# Patient Record
Sex: FEMALE | Race: WHITE | NOT HISPANIC OR LATINO | Employment: FULL TIME | URBAN - METROPOLITAN AREA
[De-identification: names, ages, dates, MRNs, and addresses within clinical notes are randomized per-mention and may not be internally consistent; named-entity substitution may affect disease eponyms.]

---

## 2017-01-24 ENCOUNTER — COMMUNICATION - HEALTHEAST (OUTPATIENT)
Dept: FAMILY MEDICINE | Facility: CLINIC | Age: 52
End: 2017-01-24

## 2017-01-24 DIAGNOSIS — K21.9 GERD (GASTROESOPHAGEAL REFLUX DISEASE): ICD-10-CM

## 2017-01-24 DIAGNOSIS — G47.00 INSOMNIA: ICD-10-CM

## 2017-01-27 ENCOUNTER — RECORDS - HEALTHEAST (OUTPATIENT)
Dept: ADMINISTRATIVE | Facility: OTHER | Age: 52
End: 2017-01-27

## 2017-08-15 ENCOUNTER — RECORDS - HEALTHEAST (OUTPATIENT)
Dept: MAMMOGRAPHY | Facility: CLINIC | Age: 52
End: 2017-08-15

## 2017-08-15 ENCOUNTER — RECORDS - HEALTHEAST (OUTPATIENT)
Dept: GENERAL RADIOLOGY | Facility: CLINIC | Age: 52
End: 2017-08-15

## 2017-08-15 ENCOUNTER — OFFICE VISIT - HEALTHEAST (OUTPATIENT)
Dept: FAMILY MEDICINE | Facility: CLINIC | Age: 52
End: 2017-08-15

## 2017-08-15 DIAGNOSIS — M25.572 ANKLE PAIN, LEFT: ICD-10-CM

## 2017-08-15 DIAGNOSIS — Z12.31 VISIT FOR SCREENING MAMMOGRAM: ICD-10-CM

## 2017-08-15 DIAGNOSIS — M79.645 THUMB PAIN, LEFT: ICD-10-CM

## 2017-08-15 DIAGNOSIS — Z12.31 ENCOUNTER FOR SCREENING MAMMOGRAM FOR MALIGNANT NEOPLASM OF BREAST: ICD-10-CM

## 2017-08-15 DIAGNOSIS — M25.572 PAIN IN LEFT ANKLE AND JOINTS OF LEFT FOOT: ICD-10-CM

## 2017-08-15 DIAGNOSIS — G47.30 SLEEP APNEA: ICD-10-CM

## 2017-08-15 DIAGNOSIS — M79.645 PAIN IN LEFT FINGER(S): ICD-10-CM

## 2017-08-22 ENCOUNTER — OFFICE VISIT - HEALTHEAST (OUTPATIENT)
Dept: SLEEP MEDICINE | Facility: CLINIC | Age: 52
End: 2017-08-22

## 2017-08-22 DIAGNOSIS — G47.10 HYPERSOMNIA, UNSPECIFIED: ICD-10-CM

## 2017-08-22 DIAGNOSIS — G47.8 SLEEP DYSFUNCTION WITH SLEEP STAGE DISTURBANCE: ICD-10-CM

## 2017-08-22 DIAGNOSIS — R06.83 SNORING: ICD-10-CM

## 2017-08-22 ASSESSMENT — MIFFLIN-ST. JEOR: SCORE: 1454.53

## 2017-08-24 ENCOUNTER — RECORDS - HEALTHEAST (OUTPATIENT)
Dept: SLEEP MEDICINE | Facility: CLINIC | Age: 52
End: 2017-08-24

## 2017-08-24 ENCOUNTER — RECORDS - HEALTHEAST (OUTPATIENT)
Dept: ADMINISTRATIVE | Facility: OTHER | Age: 52
End: 2017-08-24

## 2017-08-24 DIAGNOSIS — R06.83 SNORING: ICD-10-CM

## 2017-08-24 DIAGNOSIS — G47.10 HYPERSOMNIA, UNSPECIFIED: ICD-10-CM

## 2017-08-24 DIAGNOSIS — G47.8 OTHER SLEEP DISORDERS: ICD-10-CM

## 2017-08-28 ENCOUNTER — COMMUNICATION - HEALTHEAST (OUTPATIENT)
Dept: SLEEP MEDICINE | Facility: CLINIC | Age: 52
End: 2017-08-28

## 2017-09-05 ENCOUNTER — COMMUNICATION - HEALTHEAST (OUTPATIENT)
Dept: FAMILY MEDICINE | Facility: CLINIC | Age: 52
End: 2017-09-05

## 2017-09-05 DIAGNOSIS — K21.9 GERD (GASTROESOPHAGEAL REFLUX DISEASE): ICD-10-CM

## 2017-09-05 DIAGNOSIS — G47.00 INSOMNIA: ICD-10-CM

## 2017-09-13 ENCOUNTER — COMMUNICATION - HEALTHEAST (OUTPATIENT)
Dept: SCHEDULING | Facility: CLINIC | Age: 52
End: 2017-09-13

## 2017-09-13 DIAGNOSIS — G47.00 INSOMNIA: ICD-10-CM

## 2017-12-18 ENCOUNTER — OFFICE VISIT - HEALTHEAST (OUTPATIENT)
Dept: FAMILY MEDICINE | Facility: CLINIC | Age: 52
End: 2017-12-18

## 2017-12-18 DIAGNOSIS — M79.662 PAIN IN BOTH LOWER LEGS: ICD-10-CM

## 2017-12-18 DIAGNOSIS — M79.661 PAIN IN BOTH LOWER LEGS: ICD-10-CM

## 2018-03-06 ENCOUNTER — COMMUNICATION - HEALTHEAST (OUTPATIENT)
Dept: FAMILY MEDICINE | Facility: CLINIC | Age: 53
End: 2018-03-06

## 2018-03-06 DIAGNOSIS — G47.00 INSOMNIA: ICD-10-CM

## 2018-03-16 ENCOUNTER — COMMUNICATION - HEALTHEAST (OUTPATIENT)
Dept: FAMILY MEDICINE | Facility: CLINIC | Age: 53
End: 2018-03-16

## 2018-03-16 DIAGNOSIS — G47.00 INSOMNIA: ICD-10-CM

## 2018-03-24 ENCOUNTER — COMMUNICATION - HEALTHEAST (OUTPATIENT)
Dept: FAMILY MEDICINE | Facility: CLINIC | Age: 53
End: 2018-03-24

## 2018-03-24 DIAGNOSIS — G47.00 INSOMNIA: ICD-10-CM

## 2018-06-05 ENCOUNTER — COMMUNICATION - HEALTHEAST (OUTPATIENT)
Dept: FAMILY MEDICINE | Facility: CLINIC | Age: 53
End: 2018-06-05

## 2018-06-05 DIAGNOSIS — G47.00 INSOMNIA: ICD-10-CM

## 2018-06-11 ENCOUNTER — COMMUNICATION - HEALTHEAST (OUTPATIENT)
Dept: FAMILY MEDICINE | Facility: CLINIC | Age: 53
End: 2018-06-11

## 2018-06-11 DIAGNOSIS — G47.00 INSOMNIA: ICD-10-CM

## 2018-06-26 ENCOUNTER — COMMUNICATION - HEALTHEAST (OUTPATIENT)
Dept: FAMILY MEDICINE | Facility: CLINIC | Age: 53
End: 2018-06-26

## 2018-06-26 DIAGNOSIS — K21.9 GERD (GASTROESOPHAGEAL REFLUX DISEASE): ICD-10-CM

## 2018-09-06 ENCOUNTER — OFFICE VISIT - HEALTHEAST (OUTPATIENT)
Dept: FAMILY MEDICINE | Facility: CLINIC | Age: 53
End: 2018-09-06

## 2018-09-06 DIAGNOSIS — Z12.31 VISIT FOR SCREENING MAMMOGRAM: ICD-10-CM

## 2018-09-06 DIAGNOSIS — J39.2 THROAT IRRITATION: ICD-10-CM

## 2018-09-06 DIAGNOSIS — Z00.00 ROUTINE GENERAL MEDICAL EXAMINATION AT A HEALTH CARE FACILITY: ICD-10-CM

## 2018-09-06 DIAGNOSIS — R05.9 COUGH: ICD-10-CM

## 2018-09-06 DIAGNOSIS — Z98.84 BARIATRIC SURGERY STATUS: ICD-10-CM

## 2018-09-06 DIAGNOSIS — M79.645 THUMB PAIN, LEFT: ICD-10-CM

## 2018-09-06 DIAGNOSIS — R42 VERTIGO: ICD-10-CM

## 2018-09-06 DIAGNOSIS — K76.0 FATTY LIVER: ICD-10-CM

## 2018-09-06 DIAGNOSIS — R42 DIZZINESS: ICD-10-CM

## 2018-09-06 DIAGNOSIS — R00.0 TACHYCARDIA: ICD-10-CM

## 2018-09-06 DIAGNOSIS — D35.1 BENIGN NEOPLASM OF PARATHYROID GLAND: ICD-10-CM

## 2018-09-06 DIAGNOSIS — Z12.11 SCREEN FOR COLON CANCER: ICD-10-CM

## 2018-09-06 LAB
ALBUMIN SERPL-MCNC: 3.8 G/DL (ref 3.5–5)
ALP SERPL-CCNC: 72 U/L (ref 45–120)
ALT SERPL W P-5'-P-CCNC: 67 U/L (ref 0–45)
ANION GAP SERPL CALCULATED.3IONS-SCNC: 8 MMOL/L (ref 5–18)
AST SERPL W P-5'-P-CCNC: 48 U/L (ref 0–40)
BILIRUB SERPL-MCNC: 0.2 MG/DL (ref 0–1)
BUN SERPL-MCNC: 16 MG/DL (ref 8–22)
CALCIUM SERPL-MCNC: 9.1 MG/DL (ref 8.5–10.5)
CHLORIDE BLD-SCNC: 106 MMOL/L (ref 98–107)
CHOLEST SERPL-MCNC: 203 MG/DL
CO2 SERPL-SCNC: 29 MMOL/L (ref 22–31)
CREAT SERPL-MCNC: 0.81 MG/DL (ref 0.6–1.1)
ERYTHROCYTE [DISTWIDTH] IN BLOOD BY AUTOMATED COUNT: 12.6 % (ref 11–14.5)
FASTING STATUS PATIENT QL REPORTED: NO
GFR SERPL CREATININE-BSD FRML MDRD: >60 ML/MIN/1.73M2
GLUCOSE BLD-MCNC: 88 MG/DL (ref 70–125)
HCT VFR BLD AUTO: 39.9 % (ref 35–47)
HDLC SERPL-MCNC: 75 MG/DL
HGB BLD-MCNC: 13 G/DL (ref 12–16)
LDLC SERPL CALC-MCNC: 109 MG/DL
MCH RBC QN AUTO: 29.5 PG (ref 27–34)
MCHC RBC AUTO-ENTMCNC: 32.6 G/DL (ref 32–36)
MCV RBC AUTO: 90 FL (ref 80–100)
PLATELET # BLD AUTO: 186 THOU/UL (ref 140–440)
PMV BLD AUTO: 8.2 FL (ref 7–10)
POTASSIUM BLD-SCNC: 4.2 MMOL/L (ref 3.5–5)
PROT SERPL-MCNC: 6.8 G/DL (ref 6–8)
PTH-INTACT SERPL-MCNC: 60 PG/ML (ref 10–86)
RBC # BLD AUTO: 4.41 MILL/UL (ref 3.8–5.4)
SODIUM SERPL-SCNC: 143 MMOL/L (ref 136–145)
TRIGL SERPL-MCNC: 96 MG/DL
TSH SERPL DL<=0.005 MIU/L-ACNC: 0.76 UIU/ML (ref 0.3–5)
VIT B12 SERPL-MCNC: >2000 PG/ML (ref 213–816)
WBC: 6.7 THOU/UL (ref 4–11)

## 2018-09-06 ASSESSMENT — MIFFLIN-ST. JEOR: SCORE: 1432.76

## 2018-09-07 ENCOUNTER — OFFICE VISIT - HEALTHEAST (OUTPATIENT)
Dept: CARDIOLOGY | Facility: CLINIC | Age: 53
End: 2018-09-07

## 2018-09-07 DIAGNOSIS — R00.0 TACHYCARDIA: ICD-10-CM

## 2018-09-07 DIAGNOSIS — G47.33 OSA (OBSTRUCTIVE SLEEP APNEA): ICD-10-CM

## 2018-09-07 LAB
25(OH)D3 SERPL-MCNC: 47.4 NG/ML (ref 30–80)
ATRIAL RATE - MUSE: 75 BPM
DIASTOLIC BLOOD PRESSURE - MUSE: NORMAL MMHG
INTERPRETATION ECG - MUSE: NORMAL
P AXIS - MUSE: 34 DEGREES
PR INTERVAL - MUSE: 172 MS
QRS DURATION - MUSE: 104 MS
QT - MUSE: 394 MS
QTC - MUSE: 439 MS
R AXIS - MUSE: 42 DEGREES
SYSTOLIC BLOOD PRESSURE - MUSE: NORMAL MMHG
T AXIS - MUSE: 36 DEGREES
VENTRICULAR RATE- MUSE: 75 BPM

## 2018-09-07 ASSESSMENT — MIFFLIN-ST. JEOR: SCORE: 1437.3

## 2018-09-25 ENCOUNTER — OFFICE VISIT - HEALTHEAST (OUTPATIENT)
Dept: OTOLARYNGOLOGY | Facility: CLINIC | Age: 53
End: 2018-09-25

## 2018-09-25 ENCOUNTER — OFFICE VISIT - HEALTHEAST (OUTPATIENT)
Dept: AUDIOLOGY | Facility: CLINIC | Age: 53
End: 2018-09-25

## 2018-09-25 DIAGNOSIS — R42 DIZZINESS AND GIDDINESS: ICD-10-CM

## 2018-09-25 DIAGNOSIS — H90.3 SENSORINEURAL HEARING LOSS, BILATERAL: ICD-10-CM

## 2018-09-25 DIAGNOSIS — R42 DIZZINESS: ICD-10-CM

## 2018-10-04 ENCOUNTER — HOSPITAL ENCOUNTER (OUTPATIENT)
Dept: MAMMOGRAPHY | Facility: CLINIC | Age: 53
Discharge: HOME | End: 2018-10-04
Attending: FAMILY MEDICINE

## 2018-10-04 ENCOUNTER — OFFICE VISIT - HEALTHEAST (OUTPATIENT)
Dept: OTOLARYNGOLOGY | Facility: CLINIC | Age: 53
End: 2018-10-04

## 2018-10-04 DIAGNOSIS — R07.0 THROAT PAIN: ICD-10-CM

## 2018-10-04 DIAGNOSIS — K21.9 GERD (GASTROESOPHAGEAL REFLUX DISEASE): ICD-10-CM

## 2018-10-04 DIAGNOSIS — Z12.31 VISIT FOR SCREENING MAMMOGRAM: ICD-10-CM

## 2018-10-12 ENCOUNTER — RECORDS - HEALTHEAST (OUTPATIENT)
Dept: ADMINISTRATIVE | Facility: OTHER | Age: 53
End: 2018-10-12

## 2018-11-15 ENCOUNTER — COMMUNICATION - HEALTHEAST (OUTPATIENT)
Dept: FAMILY MEDICINE | Facility: CLINIC | Age: 53
End: 2018-11-15

## 2018-11-15 DIAGNOSIS — G47.00 INSOMNIA: ICD-10-CM

## 2018-11-29 ENCOUNTER — COMMUNICATION - HEALTHEAST (OUTPATIENT)
Dept: FAMILY MEDICINE | Facility: CLINIC | Age: 53
End: 2018-11-29

## 2018-11-29 DIAGNOSIS — K21.9 GERD (GASTROESOPHAGEAL REFLUX DISEASE): ICD-10-CM

## 2019-01-09 ENCOUNTER — RECORDS - HEALTHEAST (OUTPATIENT)
Dept: ADMINISTRATIVE | Facility: OTHER | Age: 54
End: 2019-01-09

## 2019-01-24 ENCOUNTER — OFFICE VISIT - HEALTHEAST (OUTPATIENT)
Dept: FAMILY MEDICINE | Facility: CLINIC | Age: 54
End: 2019-01-24

## 2019-01-24 DIAGNOSIS — K76.0 FATTY LIVER: ICD-10-CM

## 2019-01-24 DIAGNOSIS — Z98.84 BARIATRIC SURGERY STATUS: ICD-10-CM

## 2019-01-24 DIAGNOSIS — M62.838 MUSCLE SPASM: ICD-10-CM

## 2019-01-24 DIAGNOSIS — D12.6 ADENOMATOUS POLYP OF COLON, UNSPECIFIED PART OF COLON: ICD-10-CM

## 2019-01-24 DIAGNOSIS — R79.89 ELEVATED LFTS: ICD-10-CM

## 2019-01-24 DIAGNOSIS — K52.9 ENTERITIS: ICD-10-CM

## 2019-02-28 ENCOUNTER — RECORDS - HEALTHEAST (OUTPATIENT)
Dept: ADMINISTRATIVE | Facility: OTHER | Age: 54
End: 2019-02-28

## 2019-03-01 ENCOUNTER — HOSPITAL ENCOUNTER (OUTPATIENT)
Dept: RADIOLOGY | Facility: CLINIC | Age: 54
Discharge: HOME | End: 2019-03-01
Attending: INTERNAL MEDICINE

## 2019-03-01 DIAGNOSIS — K52.9 ENTERITIS: ICD-10-CM

## 2019-03-01 DIAGNOSIS — R94.5 ABNORMAL RESULTS OF LIVER FUNCTION STUDIES: ICD-10-CM

## 2019-03-01 DIAGNOSIS — R79.89 ELEVATED LFTS: ICD-10-CM

## 2019-03-07 ENCOUNTER — RECORDS - HEALTHEAST (OUTPATIENT)
Dept: ADMINISTRATIVE | Facility: OTHER | Age: 54
End: 2019-03-07

## 2019-03-21 ENCOUNTER — COMMUNICATION - HEALTHEAST (OUTPATIENT)
Dept: FAMILY MEDICINE | Facility: CLINIC | Age: 54
End: 2019-03-21

## 2019-07-22 ENCOUNTER — RECORDS - HEALTHEAST (OUTPATIENT)
Dept: ADMINISTRATIVE | Facility: OTHER | Age: 54
End: 2019-07-22

## 2019-07-23 ENCOUNTER — RECORDS - HEALTHEAST (OUTPATIENT)
Dept: LAB | Facility: CLINIC | Age: 54
End: 2019-07-23

## 2019-07-23 ENCOUNTER — COMMUNICATION - HEALTHEAST (OUTPATIENT)
Dept: FAMILY MEDICINE | Facility: CLINIC | Age: 54
End: 2019-07-23

## 2019-07-23 DIAGNOSIS — K21.9 GERD (GASTROESOPHAGEAL REFLUX DISEASE): ICD-10-CM

## 2019-07-23 LAB
CALCIUM SERPL-MCNC: 9.7 MG/DL (ref 8.5–10.5)
TSH SERPL DL<=0.005 MIU/L-ACNC: 0.78 UIU/ML (ref 0.3–5)

## 2019-08-01 ENCOUNTER — COMMUNICATION - HEALTHEAST (OUTPATIENT)
Dept: FAMILY MEDICINE | Facility: CLINIC | Age: 54
End: 2019-08-01

## 2019-08-01 DIAGNOSIS — K21.9 GERD (GASTROESOPHAGEAL REFLUX DISEASE): ICD-10-CM

## 2019-08-15 ENCOUNTER — HOSPITAL ENCOUNTER (OUTPATIENT)
Dept: RADIOLOGY | Facility: CLINIC | Age: 54
Discharge: HOME | End: 2019-08-15
Attending: OTOLARYNGOLOGY

## 2019-08-15 DIAGNOSIS — R09.A2 FEELING OF FOREIGN BODY IN THROAT: ICD-10-CM

## 2019-08-15 DIAGNOSIS — R13.10 DYSPHAGIA, UNSPECIFIED TYPE: ICD-10-CM

## 2019-08-15 DIAGNOSIS — R49.0 HOARSENESS: ICD-10-CM

## 2019-09-28 ENCOUNTER — COMMUNICATION - HEALTHEAST (OUTPATIENT)
Dept: FAMILY MEDICINE | Facility: CLINIC | Age: 54
End: 2019-09-28

## 2019-09-28 DIAGNOSIS — G47.00 INSOMNIA: ICD-10-CM

## 2019-12-16 ENCOUNTER — COMMUNICATION - HEALTHEAST (OUTPATIENT)
Dept: FAMILY MEDICINE | Facility: CLINIC | Age: 54
End: 2019-12-16

## 2019-12-16 DIAGNOSIS — G47.00 INSOMNIA: ICD-10-CM

## 2020-01-03 ENCOUNTER — COMMUNICATION - HEALTHEAST (OUTPATIENT)
Dept: FAMILY MEDICINE | Facility: CLINIC | Age: 55
End: 2020-01-03

## 2020-01-03 DIAGNOSIS — K21.9 GERD (GASTROESOPHAGEAL REFLUX DISEASE): ICD-10-CM

## 2020-03-05 ENCOUNTER — COMMUNICATION - HEALTHEAST (OUTPATIENT)
Dept: FAMILY MEDICINE | Facility: CLINIC | Age: 55
End: 2020-03-05

## 2020-03-05 DIAGNOSIS — G47.00 INSOMNIA: ICD-10-CM

## 2020-03-17 ENCOUNTER — HOSPITAL ENCOUNTER (OUTPATIENT)
Dept: MAMMOGRAPHY | Facility: CLINIC | Age: 55
Discharge: HOME | End: 2020-03-17

## 2020-03-17 DIAGNOSIS — Z12.31 VISIT FOR SCREENING MAMMOGRAM: ICD-10-CM

## 2020-05-21 ENCOUNTER — RECORDS - HEALTHEAST (OUTPATIENT)
Dept: LAB | Facility: CLINIC | Age: 55
End: 2020-05-21

## 2020-05-21 LAB
ALBUMIN SERPL-MCNC: 3.8 G/DL (ref 3.5–5)
ALP SERPL-CCNC: 74 U/L (ref 45–120)
ALT SERPL W P-5'-P-CCNC: 59 U/L (ref 0–45)
ANION GAP SERPL CALCULATED.3IONS-SCNC: 9 MMOL/L (ref 5–18)
AST SERPL W P-5'-P-CCNC: 35 U/L (ref 0–40)
BILIRUB SERPL-MCNC: 0.3 MG/DL (ref 0–1)
BUN SERPL-MCNC: 19 MG/DL (ref 8–22)
C REACTIVE PROTEIN LHE: 0.2 MG/DL (ref 0–0.8)
CALCIUM SERPL-MCNC: 9.2 MG/DL (ref 8.5–10.5)
CHLORIDE BLD-SCNC: 105 MMOL/L (ref 98–107)
CK SERPL-CCNC: 115 U/L (ref 30–190)
CO2 SERPL-SCNC: 27 MMOL/L (ref 22–31)
CREAT SERPL-MCNC: 0.74 MG/DL (ref 0.6–1.1)
ERYTHROCYTE [SEDIMENTATION RATE] IN BLOOD BY WESTERGREN METHOD: 21 MM/HR (ref 0–20)
GFR SERPL CREATININE-BSD FRML MDRD: >60 ML/MIN/1.73M2
GLUCOSE BLD-MCNC: 94 MG/DL (ref 70–125)
POTASSIUM BLD-SCNC: 4.3 MMOL/L (ref 3.5–5)
PROT SERPL-MCNC: 7 G/DL (ref 6–8)
SODIUM SERPL-SCNC: 141 MMOL/L (ref 136–145)

## 2020-05-26 ENCOUNTER — RECORDS - HEALTHEAST (OUTPATIENT)
Dept: LAB | Facility: CLINIC | Age: 55
End: 2020-05-26

## 2020-05-26 LAB — TSH SERPL DL<=0.005 MIU/L-ACNC: 0.8 UIU/ML (ref 0.3–5)

## 2020-06-16 ENCOUNTER — COMMUNICATION - HEALTHEAST (OUTPATIENT)
Dept: FAMILY MEDICINE | Facility: CLINIC | Age: 55
End: 2020-06-16

## 2020-06-16 DIAGNOSIS — K21.9 GERD (GASTROESOPHAGEAL REFLUX DISEASE): ICD-10-CM

## 2020-06-24 ENCOUNTER — RECORDS - HEALTHEAST (OUTPATIENT)
Dept: LAB | Facility: CLINIC | Age: 55
End: 2020-06-24

## 2020-06-24 LAB
ALBUMIN SERPL-MCNC: 3.6 G/DL (ref 3.5–5)
ALP SERPL-CCNC: 78 U/L (ref 45–120)
ALT SERPL W P-5'-P-CCNC: 55 U/L (ref 0–45)
AST SERPL W P-5'-P-CCNC: 40 U/L (ref 0–40)
BILIRUB DIRECT SERPL-MCNC: 0.1 MG/DL
BILIRUB SERPL-MCNC: 0.4 MG/DL (ref 0–1)
ERYTHROCYTE [SEDIMENTATION RATE] IN BLOOD BY WESTERGREN METHOD: 24 MM/HR (ref 0–20)
PROT SERPL-MCNC: 6.6 G/DL (ref 6–8)

## 2020-07-20 ENCOUNTER — COMMUNICATION - HEALTHEAST (OUTPATIENT)
Dept: FAMILY MEDICINE | Facility: CLINIC | Age: 55
End: 2020-07-20

## 2020-08-03 ENCOUNTER — COMMUNICATION - HEALTHEAST (OUTPATIENT)
Dept: FAMILY MEDICINE | Facility: CLINIC | Age: 55
End: 2020-08-03

## 2020-10-14 DIAGNOSIS — Z11.59 ENCOUNTER FOR SCREENING FOR OTHER VIRAL DISEASES: Primary | ICD-10-CM

## 2020-11-02 ENCOUNTER — RECORDS - HEALTHEAST (OUTPATIENT)
Dept: LAB | Facility: CLINIC | Age: 55
End: 2020-11-02

## 2020-11-02 ENCOUNTER — TRANSFERRED RECORDS (OUTPATIENT)
Dept: HEALTH INFORMATION MANAGEMENT | Facility: CLINIC | Age: 55
End: 2020-11-02

## 2020-11-02 LAB
C REACTIVE PROTEIN LHE: 0.2 MG/DL (ref 0–0.8)
CK SERPL-CCNC: 109 U/L (ref 30–190)

## 2020-11-03 LAB — B BURGDOR IGG+IGM SER QL: 0.08 INDEX VALUE

## 2020-11-05 LAB — ANA SER QL: 1.5 U

## 2020-11-14 DIAGNOSIS — Z11.59 ENCOUNTER FOR SCREENING FOR OTHER VIRAL DISEASES: ICD-10-CM

## 2020-11-14 PROCEDURE — 99000 SPECIMEN HANDLING OFFICE-LAB: CPT | Performed by: PATHOLOGY

## 2020-11-14 PROCEDURE — U0003 INFECTIOUS AGENT DETECTION BY NUCLEIC ACID (DNA OR RNA); SEVERE ACUTE RESPIRATORY SYNDROME CORONAVIRUS 2 (SARS-COV-2) (CORONAVIRUS DISEASE [COVID-19]), AMPLIFIED PROBE TECHNIQUE, MAKING USE OF HIGH THROUGHPUT TECHNOLOGIES AS DESCRIBED BY CMS-2020-01-R: HCPCS | Mod: 90 | Performed by: PATHOLOGY

## 2020-11-15 LAB
SARS-COV-2 RNA SPEC QL NAA+PROBE: NOT DETECTED
SPECIMEN SOURCE: NORMAL

## 2020-11-17 RX ORDER — ONDANSETRON 4 MG/1
4 TABLET, ORALLY DISINTEGRATING ORAL EVERY 8 HOURS PRN
COMMUNITY

## 2020-11-17 RX ORDER — NICOTINE POLACRILEX 4 MG/1
20 GUM, CHEWING ORAL EVERY MORNING
COMMUNITY

## 2020-11-17 RX ORDER — METRONIDAZOLE 500 MG/1
500 TABLET ORAL 3 TIMES DAILY
Status: ON HOLD | COMMUNITY
Start: 2020-11-17 | End: 2020-11-19

## 2020-11-17 RX ORDER — MULTIVITAMIN,THERAPEUTIC
1 TABLET ORAL DAILY
COMMUNITY

## 2020-11-17 RX ORDER — NEOMYCIN SULFATE 500 MG/1
1000 TABLET ORAL 3 TIMES DAILY
Status: ON HOLD | COMMUNITY
Start: 2020-11-17 | End: 2020-11-19

## 2020-11-17 RX ORDER — AMITRIPTYLINE HYDROCHLORIDE 50 MG/1
50 TABLET ORAL AT BEDTIME
COMMUNITY

## 2020-11-17 SDOH — HEALTH STABILITY: MENTAL HEALTH: HOW OFTEN DO YOU HAVE A DRINK CONTAINING ALCOHOL?: NEVER

## 2020-11-17 NOTE — PROGRESS NOTES
PTA medications updated by Medication Scribe prior to surgery via phone call with patient      -LAST DOSES ENTERED BY NURSE-    Comments:    Medication history sources: Patient, Care Everywhere and (Chart Review)  Medication history source reliability: Good  Adherence assessment: N/A Not Observed    Significant changes made to the medication list:  None      Additional medication history information:   None        Prior to Admission medications    Medication Sig Last Dose Taking? Auth Provider   amitriptyline (ELAVIL) 50 MG tablet Take 50 mg by mouth At Bedtime  at HS Yes Reported, Patient   metroNIDAZOLE (FLAGYL) 500 MG tablet Take 500 mg by mouth 3 times daily (For 1 day) at 13:00, 14:00 and 23:00  at 2300 Yes Reported, Patient   neomycin (MYCIFRADIN) 500 MG tablet Take 1,000 mg by mouth 3 times daily (for 1 day)(2 X 500 mg) at 13:00, 14:00 and 23:00  at 2300 Yes Reported, Patient   omeprazole 20 MG tablet Take 20 mg by mouth every morning  at AM Yes Reported, Patient   ondansetron (ZOFRAN-ODT) 4 MG ODT tab Take 4 mg by mouth every 8 hours as needed for nausea  at PRN Yes Reported, Patient

## 2020-11-18 ENCOUNTER — ANESTHESIA (OUTPATIENT)
Dept: SURGERY | Facility: CLINIC | Age: 55
End: 2020-11-18
Payer: COMMERCIAL

## 2020-11-18 ENCOUNTER — HOSPITAL ENCOUNTER (INPATIENT)
Facility: CLINIC | Age: 55
LOS: 2 days | Discharge: HOME | End: 2020-11-20
Attending: COLON & RECTAL SURGERY | Admitting: COLON & RECTAL SURGERY
Payer: COMMERCIAL

## 2020-11-18 ENCOUNTER — ANESTHESIA EVENT (OUTPATIENT)
Dept: SURGERY | Facility: CLINIC | Age: 55
End: 2020-11-18
Payer: COMMERCIAL

## 2020-11-18 DIAGNOSIS — K62.3 RECTAL PROLAPSE: Primary | ICD-10-CM

## 2020-11-18 LAB — HGB BLD-MCNC: 13.5 G/DL (ref 11.7–15.7)

## 2020-11-18 PROCEDURE — 250N000011 HC RX IP 250 OP 636: Performed by: NURSE ANESTHETIST, CERTIFIED REGISTERED

## 2020-11-18 PROCEDURE — 258N000003 HC RX IP 258 OP 636: Performed by: COLON & RECTAL SURGERY

## 2020-11-18 PROCEDURE — 360N000069 HC SURGERY LEVEL 8 EA 15 ADDTL MIN: Performed by: COLON & RECTAL SURGERY

## 2020-11-18 PROCEDURE — 0DSP4ZZ REPOSITION RECTUM, PERCUTANEOUS ENDOSCOPIC APPROACH: ICD-10-PCS | Performed by: COLON & RECTAL SURGERY

## 2020-11-18 PROCEDURE — 370N000001 HC ANESTHESIA TECHNICAL FEE, 1ST 30 MIN: Performed by: COLON & RECTAL SURGERY

## 2020-11-18 PROCEDURE — 999N000138 HC STATISTIC PRE-PROCEDURE ASSESSMENT I: Performed by: COLON & RECTAL SURGERY

## 2020-11-18 PROCEDURE — 250N000011 HC RX IP 250 OP 636: Performed by: COLON & RECTAL SURGERY

## 2020-11-18 PROCEDURE — 8E0W4CZ ROBOTIC ASSISTED PROCEDURE OF TRUNK REGION, PERCUTANEOUS ENDOSCOPIC APPROACH: ICD-10-PCS | Performed by: COLON & RECTAL SURGERY

## 2020-11-18 PROCEDURE — 272N000001 HC OR GENERAL SUPPLY STERILE: Performed by: COLON & RECTAL SURGERY

## 2020-11-18 PROCEDURE — C1781 MESH (IMPLANTABLE): HCPCS | Performed by: COLON & RECTAL SURGERY

## 2020-11-18 PROCEDURE — 370N000002 HC ANESTHESIA TECHNICAL FEE, EACH ADDTL 15 MIN: Performed by: COLON & RECTAL SURGERY

## 2020-11-18 PROCEDURE — 0DUP4JZ SUPPLEMENT RECTUM WITH SYNTHETIC SUBSTITUTE, PERCUTANEOUS ENDOSCOPIC APPROACH: ICD-10-PCS | Performed by: COLON & RECTAL SURGERY

## 2020-11-18 PROCEDURE — 250N000009 HC RX 250: Performed by: NURSE ANESTHETIST, CERTIFIED REGISTERED

## 2020-11-18 PROCEDURE — 250N000003 HC SEVOFLURANE, EA 15 MIN: Performed by: COLON & RECTAL SURGERY

## 2020-11-18 PROCEDURE — 360N000068 HC SURGERY LEVEL 8 1ST 30 MIN: Performed by: COLON & RECTAL SURGERY

## 2020-11-18 PROCEDURE — 85018 HEMOGLOBIN: CPT | Performed by: COLON & RECTAL SURGERY

## 2020-11-18 PROCEDURE — 250N000009 HC RX 250: Performed by: COLON & RECTAL SURGERY

## 2020-11-18 PROCEDURE — 250N000011 HC RX IP 250 OP 636: Performed by: ANESTHESIOLOGY

## 2020-11-18 PROCEDURE — 761N000001 HC RECOVERY PHASE 1 LEVEL 1 FIRST HR: Performed by: COLON & RECTAL SURGERY

## 2020-11-18 PROCEDURE — 36415 COLL VENOUS BLD VENIPUNCTURE: CPT | Performed by: COLON & RECTAL SURGERY

## 2020-11-18 PROCEDURE — 120N000001 HC R&B MED SURG/OB

## 2020-11-18 PROCEDURE — 258N000003 HC RX IP 258 OP 636: Performed by: ANESTHESIOLOGY

## 2020-11-18 PROCEDURE — P9041 ALBUMIN (HUMAN),5%, 50ML: HCPCS | Performed by: NURSE ANESTHETIST, CERTIFIED REGISTERED

## 2020-11-18 PROCEDURE — 258N000001 HC RX 258: Performed by: COLON & RECTAL SURGERY

## 2020-11-18 PROCEDURE — 761N000002 HC RECOVERY PHASE 1 LEVEL 1 EA ADDTL HR: Performed by: COLON & RECTAL SURGERY

## 2020-11-18 PROCEDURE — 250N000013 HC RX MED GY IP 250 OP 250 PS 637: Performed by: COLON & RECTAL SURGERY

## 2020-11-18 PROCEDURE — 0USG4ZZ REPOSITION VAGINA, PERCUTANEOUS ENDOSCOPIC APPROACH: ICD-10-PCS | Performed by: COLON & RECTAL SURGERY

## 2020-11-18 DEVICE — MESH SLING FLAT RESTORELLE 24X8CM 501440: Type: IMPLANTABLE DEVICE | Site: PELVIS | Status: FUNCTIONAL

## 2020-11-18 RX ORDER — NALOXONE HYDROCHLORIDE 0.4 MG/ML
0.2 INJECTION, SOLUTION INTRAMUSCULAR; INTRAVENOUS; SUBCUTANEOUS
Status: DISCONTINUED | OUTPATIENT
Start: 2020-11-18 | End: 2020-11-20 | Stop reason: HOSPADM

## 2020-11-18 RX ORDER — ONDANSETRON 2 MG/ML
INJECTION INTRAMUSCULAR; INTRAVENOUS PRN
Status: DISCONTINUED | OUTPATIENT
Start: 2020-11-18 | End: 2020-11-18

## 2020-11-18 RX ORDER — NALOXONE HYDROCHLORIDE 0.4 MG/ML
0.4 INJECTION, SOLUTION INTRAMUSCULAR; INTRAVENOUS; SUBCUTANEOUS
Status: DISCONTINUED | OUTPATIENT
Start: 2020-11-18 | End: 2020-11-20 | Stop reason: HOSPADM

## 2020-11-18 RX ORDER — ONDANSETRON 2 MG/ML
4 INJECTION INTRAMUSCULAR; INTRAVENOUS EVERY 30 MIN PRN
Status: DISCONTINUED | OUTPATIENT
Start: 2020-11-18 | End: 2020-11-18 | Stop reason: HOSPADM

## 2020-11-18 RX ORDER — CEFOTETAN DISODIUM 2 G/20ML
2 INJECTION, POWDER, FOR SOLUTION INTRAMUSCULAR; INTRAVENOUS EVERY 12 HOURS
Status: COMPLETED | OUTPATIENT
Start: 2020-11-18 | End: 2020-11-19

## 2020-11-18 RX ORDER — CEFOTETAN DISODIUM 1 G/10ML
1 INJECTION, POWDER, FOR SOLUTION INTRAMUSCULAR; INTRAVENOUS SEE ADMIN INSTRUCTIONS
Status: DISCONTINUED | OUTPATIENT
Start: 2020-11-18 | End: 2020-11-18 | Stop reason: HOSPADM

## 2020-11-18 RX ORDER — MEPERIDINE HYDROCHLORIDE 25 MG/ML
12.5 INJECTION INTRAMUSCULAR; INTRAVENOUS; SUBCUTANEOUS EVERY 5 MIN PRN
Status: DISCONTINUED | OUTPATIENT
Start: 2020-11-18 | End: 2020-11-18 | Stop reason: HOSPADM

## 2020-11-18 RX ORDER — PROPOFOL 10 MG/ML
INJECTION, EMULSION INTRAVENOUS PRN
Status: DISCONTINUED | OUTPATIENT
Start: 2020-11-18 | End: 2020-11-18

## 2020-11-18 RX ORDER — FENTANYL CITRATE 50 UG/ML
25-50 INJECTION, SOLUTION INTRAMUSCULAR; INTRAVENOUS EVERY 5 MIN PRN
Status: DISCONTINUED | OUTPATIENT
Start: 2020-11-18 | End: 2020-11-18 | Stop reason: HOSPADM

## 2020-11-18 RX ORDER — ALBUTEROL SULFATE 0.83 MG/ML
2.5 SOLUTION RESPIRATORY (INHALATION) EVERY 4 HOURS PRN
Status: DISCONTINUED | OUTPATIENT
Start: 2020-11-18 | End: 2020-11-18 | Stop reason: HOSPADM

## 2020-11-18 RX ORDER — HYDROMORPHONE HYDROCHLORIDE 1 MG/ML
.3-.5 INJECTION, SOLUTION INTRAMUSCULAR; INTRAVENOUS; SUBCUTANEOUS EVERY 5 MIN PRN
Status: DISCONTINUED | OUTPATIENT
Start: 2020-11-18 | End: 2020-11-18 | Stop reason: HOSPADM

## 2020-11-18 RX ORDER — CEFOTETAN DISODIUM 1 G/10ML
1 INJECTION, POWDER, FOR SOLUTION INTRAMUSCULAR; INTRAVENOUS
Status: COMPLETED | OUTPATIENT
Start: 2020-11-18 | End: 2020-11-18

## 2020-11-18 RX ORDER — ONDANSETRON 4 MG/1
4 TABLET, ORALLY DISINTEGRATING ORAL EVERY 30 MIN PRN
Status: DISCONTINUED | OUTPATIENT
Start: 2020-11-18 | End: 2020-11-18 | Stop reason: HOSPADM

## 2020-11-18 RX ORDER — DEXAMETHASONE SODIUM PHOSPHATE 4 MG/ML
INJECTION, SOLUTION INTRA-ARTICULAR; INTRALESIONAL; INTRAMUSCULAR; INTRAVENOUS; SOFT TISSUE PRN
Status: DISCONTINUED | OUTPATIENT
Start: 2020-11-18 | End: 2020-11-18

## 2020-11-18 RX ORDER — MAGNESIUM HYDROXIDE 1200 MG/15ML
LIQUID ORAL PRN
Status: DISCONTINUED | OUTPATIENT
Start: 2020-11-18 | End: 2020-11-18 | Stop reason: HOSPADM

## 2020-11-18 RX ORDER — FENTANYL CITRATE 50 UG/ML
INJECTION, SOLUTION INTRAMUSCULAR; INTRAVENOUS PRN
Status: DISCONTINUED | OUTPATIENT
Start: 2020-11-18 | End: 2020-11-18

## 2020-11-18 RX ORDER — LIDOCAINE 40 MG/G
CREAM TOPICAL
Status: DISCONTINUED | OUTPATIENT
Start: 2020-11-18 | End: 2020-11-20 | Stop reason: HOSPADM

## 2020-11-18 RX ORDER — SODIUM CHLORIDE, SODIUM LACTATE, POTASSIUM CHLORIDE, CALCIUM CHLORIDE 600; 310; 30; 20 MG/100ML; MG/100ML; MG/100ML; MG/100ML
INJECTION, SOLUTION INTRAVENOUS CONTINUOUS
Status: DISCONTINUED | OUTPATIENT
Start: 2020-11-18 | End: 2020-11-18 | Stop reason: HOSPADM

## 2020-11-18 RX ORDER — AMITRIPTYLINE HYDROCHLORIDE 50 MG/1
50 TABLET ORAL AT BEDTIME
Status: DISCONTINUED | OUTPATIENT
Start: 2020-11-18 | End: 2020-11-20 | Stop reason: HOSPADM

## 2020-11-18 RX ORDER — PROPOFOL 10 MG/ML
INJECTION, EMULSION INTRAVENOUS CONTINUOUS PRN
Status: DISCONTINUED | OUTPATIENT
Start: 2020-11-18 | End: 2020-11-18

## 2020-11-18 RX ORDER — HEPARIN SODIUM 5000 [USP'U]/.5ML
5000 INJECTION, SOLUTION INTRAVENOUS; SUBCUTANEOUS
Status: COMPLETED | OUTPATIENT
Start: 2020-11-18 | End: 2020-11-18

## 2020-11-18 RX ORDER — BUPIVACAINE HYDROCHLORIDE 5 MG/ML
INJECTION, SOLUTION PERINEURAL PRN
Status: DISCONTINUED | OUTPATIENT
Start: 2020-11-18 | End: 2020-11-18 | Stop reason: HOSPADM

## 2020-11-18 RX ORDER — HYDROMORPHONE HYDROCHLORIDE 1 MG/ML
.3-.5 INJECTION, SOLUTION INTRAMUSCULAR; INTRAVENOUS; SUBCUTANEOUS
Status: DISCONTINUED | OUTPATIENT
Start: 2020-11-18 | End: 2020-11-20 | Stop reason: HOSPADM

## 2020-11-18 RX ORDER — ACETAMINOPHEN 325 MG/1
975 TABLET ORAL EVERY 8 HOURS
Status: DISCONTINUED | OUTPATIENT
Start: 2020-11-18 | End: 2020-11-20 | Stop reason: HOSPADM

## 2020-11-18 RX ORDER — HYDRALAZINE HYDROCHLORIDE 20 MG/ML
10 INJECTION INTRAMUSCULAR; INTRAVENOUS EVERY 4 HOURS PRN
Status: DISCONTINUED | OUTPATIENT
Start: 2020-11-18 | End: 2020-11-20 | Stop reason: HOSPADM

## 2020-11-18 RX ORDER — ONDANSETRON 2 MG/ML
4 INJECTION INTRAMUSCULAR; INTRAVENOUS EVERY 6 HOURS PRN
Status: DISCONTINUED | OUTPATIENT
Start: 2020-11-18 | End: 2020-11-20 | Stop reason: HOSPADM

## 2020-11-18 RX ORDER — ONDANSETRON 4 MG/1
4 TABLET, ORALLY DISINTEGRATING ORAL EVERY 8 HOURS PRN
Status: DISCONTINUED | OUTPATIENT
Start: 2020-11-18 | End: 2020-11-20 | Stop reason: HOSPADM

## 2020-11-18 RX ORDER — DIPHENHYDRAMINE HYDROCHLORIDE 50 MG/ML
25 INJECTION INTRAMUSCULAR; INTRAVENOUS EVERY 6 HOURS PRN
Status: DISCONTINUED | OUTPATIENT
Start: 2020-11-18 | End: 2020-11-20 | Stop reason: HOSPADM

## 2020-11-18 RX ORDER — ALBUMIN, HUMAN INJ 5% 5 %
SOLUTION INTRAVENOUS CONTINUOUS PRN
Status: DISCONTINUED | OUTPATIENT
Start: 2020-11-18 | End: 2020-11-18

## 2020-11-18 RX ORDER — SODIUM CHLORIDE, SODIUM LACTATE, POTASSIUM CHLORIDE, CALCIUM CHLORIDE 600; 310; 30; 20 MG/100ML; MG/100ML; MG/100ML; MG/100ML
INJECTION, SOLUTION INTRAVENOUS CONTINUOUS
Status: DISCONTINUED | OUTPATIENT
Start: 2020-11-18 | End: 2020-11-19

## 2020-11-18 RX ORDER — LIDOCAINE HYDROCHLORIDE 20 MG/ML
INJECTION, SOLUTION INFILTRATION; PERINEURAL PRN
Status: DISCONTINUED | OUTPATIENT
Start: 2020-11-18 | End: 2020-11-18

## 2020-11-18 RX ORDER — MULTIVITAMIN,THERAPEUTIC
1 TABLET ORAL DAILY
Status: DISCONTINUED | OUTPATIENT
Start: 2020-11-18 | End: 2020-11-20 | Stop reason: HOSPADM

## 2020-11-18 RX ORDER — ACETAMINOPHEN 325 MG/1
975 TABLET ORAL ONCE
Status: COMPLETED | OUTPATIENT
Start: 2020-11-18 | End: 2020-11-18

## 2020-11-18 RX ADMIN — SODIUM CHLORIDE, POTASSIUM CHLORIDE, SODIUM LACTATE AND CALCIUM CHLORIDE: 600; 310; 30; 20 INJECTION, SOLUTION INTRAVENOUS at 06:14

## 2020-11-18 RX ADMIN — SUGAMMADEX 160 MG: 100 INJECTION, SOLUTION INTRAVENOUS at 10:50

## 2020-11-18 RX ADMIN — FENTANYL CITRATE 50 MCG: 50 INJECTION, SOLUTION INTRAMUSCULAR; INTRAVENOUS at 08:23

## 2020-11-18 RX ADMIN — CEFOTETAN DISODIUM 1 G: 1 INJECTION, POWDER, FOR SOLUTION INTRAMUSCULAR; INTRAVENOUS at 07:32

## 2020-11-18 RX ADMIN — PROPOFOL 50 MCG/KG/MIN: 10 INJECTION, EMULSION INTRAVENOUS at 07:43

## 2020-11-18 RX ADMIN — FENTANYL CITRATE 25 MCG: 0.05 INJECTION, SOLUTION INTRAMUSCULAR; INTRAVENOUS at 11:43

## 2020-11-18 RX ADMIN — ROCURONIUM BROMIDE 20 MG: 10 INJECTION INTRAVENOUS at 08:57

## 2020-11-18 RX ADMIN — PROPOFOL 180 MG: 10 INJECTION, EMULSION INTRAVENOUS at 07:36

## 2020-11-18 RX ADMIN — ROCURONIUM BROMIDE 50 MG: 10 INJECTION INTRAVENOUS at 07:36

## 2020-11-18 RX ADMIN — ROCURONIUM BROMIDE 20 MG: 10 INJECTION INTRAVENOUS at 08:09

## 2020-11-18 RX ADMIN — HYDROMORPHONE HYDROCHLORIDE 0.5 MG: 1 INJECTION, SOLUTION INTRAMUSCULAR; INTRAVENOUS; SUBCUTANEOUS at 12:16

## 2020-11-18 RX ADMIN — SODIUM CHLORIDE, POTASSIUM CHLORIDE, SODIUM LACTATE AND CALCIUM CHLORIDE: 600; 310; 30; 20 INJECTION, SOLUTION INTRAVENOUS at 07:31

## 2020-11-18 RX ADMIN — DEXAMETHASONE SODIUM PHOSPHATE 4 MG: 4 INJECTION, SOLUTION INTRA-ARTICULAR; INTRALESIONAL; INTRAMUSCULAR; INTRAVENOUS; SOFT TISSUE at 07:36

## 2020-11-18 RX ADMIN — HYDROMORPHONE HYDROCHLORIDE 0.5 MG: 1 INJECTION, SOLUTION INTRAMUSCULAR; INTRAVENOUS; SUBCUTANEOUS at 12:00

## 2020-11-18 RX ADMIN — LIDOCAINE HYDROCHLORIDE 100 MG: 20 INJECTION, SOLUTION INFILTRATION; PERINEURAL at 07:36

## 2020-11-18 RX ADMIN — HYDROMORPHONE HYDROCHLORIDE 0.5 MG: 1 INJECTION, SOLUTION INTRAMUSCULAR; INTRAVENOUS; SUBCUTANEOUS at 21:01

## 2020-11-18 RX ADMIN — ROCURONIUM BROMIDE 20 MG: 10 INJECTION INTRAVENOUS at 08:45

## 2020-11-18 RX ADMIN — FENTANYL CITRATE 50 MCG: 0.05 INJECTION, SOLUTION INTRAMUSCULAR; INTRAVENOUS at 11:31

## 2020-11-18 RX ADMIN — FENTANYL CITRATE 50 MCG: 50 INJECTION, SOLUTION INTRAMUSCULAR; INTRAVENOUS at 08:47

## 2020-11-18 RX ADMIN — ACETAMINOPHEN 975 MG: 325 TABLET, FILM COATED ORAL at 14:06

## 2020-11-18 RX ADMIN — HEPARIN SODIUM 5000 UNITS: 10000 INJECTION, SOLUTION INTRAVENOUS; SUBCUTANEOUS at 07:40

## 2020-11-18 RX ADMIN — ACETAMINOPHEN 975 MG: 325 TABLET, FILM COATED ORAL at 06:13

## 2020-11-18 RX ADMIN — ACETAMINOPHEN 975 MG: 325 TABLET, FILM COATED ORAL at 21:52

## 2020-11-18 RX ADMIN — ROCURONIUM BROMIDE 10 MG: 10 INJECTION INTRAVENOUS at 08:27

## 2020-11-18 RX ADMIN — MIDAZOLAM 2 MG: 1 INJECTION INTRAMUSCULAR; INTRAVENOUS at 07:33

## 2020-11-18 RX ADMIN — SODIUM CHLORIDE, POTASSIUM CHLORIDE, SODIUM LACTATE AND CALCIUM CHLORIDE: 600; 310; 30; 20 INJECTION, SOLUTION INTRAVENOUS at 08:54

## 2020-11-18 RX ADMIN — HYDROMORPHONE HYDROCHLORIDE 0.3 MG: 1 INJECTION, SOLUTION INTRAMUSCULAR; INTRAVENOUS; SUBCUTANEOUS at 23:59

## 2020-11-18 RX ADMIN — AMITRIPTYLINE HYDROCHLORIDE 50 MG: 50 TABLET, FILM COATED ORAL at 21:52

## 2020-11-18 RX ADMIN — HYDROMORPHONE HYDROCHLORIDE 0.5 MG: 1 INJECTION, SOLUTION INTRAMUSCULAR; INTRAVENOUS; SUBCUTANEOUS at 10:29

## 2020-11-18 RX ADMIN — FENTANYL CITRATE 25 MCG: 0.05 INJECTION, SOLUTION INTRAMUSCULAR; INTRAVENOUS at 11:54

## 2020-11-18 RX ADMIN — ALBUMIN HUMAN: 0.05 INJECTION, SOLUTION INTRAVENOUS at 10:17

## 2020-11-18 RX ADMIN — CEFOTETAN DISODIUM 2 G: 2 INJECTION, POWDER, FOR SOLUTION INTRAMUSCULAR; INTRAVENOUS at 19:02

## 2020-11-18 RX ADMIN — THERA TABS 1 TABLET: TAB at 14:06

## 2020-11-18 RX ADMIN — FENTANYL CITRATE 100 MCG: 50 INJECTION, SOLUTION INTRAMUSCULAR; INTRAVENOUS at 07:33

## 2020-11-18 RX ADMIN — ROCURONIUM BROMIDE 10 MG: 10 INJECTION INTRAVENOUS at 10:20

## 2020-11-18 RX ADMIN — FENTANYL CITRATE 50 MCG: 50 INJECTION, SOLUTION INTRAMUSCULAR; INTRAVENOUS at 09:00

## 2020-11-18 RX ADMIN — HYDROMORPHONE HYDROCHLORIDE 0.5 MG: 1 INJECTION, SOLUTION INTRAMUSCULAR; INTRAVENOUS; SUBCUTANEOUS at 14:05

## 2020-11-18 RX ADMIN — SODIUM CHLORIDE, POTASSIUM CHLORIDE, SODIUM LACTATE AND CALCIUM CHLORIDE: 600; 310; 30; 20 INJECTION, SOLUTION INTRAVENOUS at 14:06

## 2020-11-18 RX ADMIN — HYDROMORPHONE HYDROCHLORIDE 0.5 MG: 1 INJECTION, SOLUTION INTRAMUSCULAR; INTRAVENOUS; SUBCUTANEOUS at 18:37

## 2020-11-18 RX ADMIN — ONDANSETRON 4 MG: 2 INJECTION INTRAMUSCULAR; INTRAVENOUS at 10:50

## 2020-11-18 RX ADMIN — ROCURONIUM BROMIDE 20 MG: 10 INJECTION INTRAVENOUS at 09:48

## 2020-11-18 ASSESSMENT — ACTIVITIES OF DAILY LIVING (ADL)
ADLS_ACUITY_SCORE: 17
ADLS_ACUITY_SCORE: 17

## 2020-11-18 ASSESSMENT — MIFFLIN-ST. JEOR: SCORE: 1428.22

## 2020-11-18 NOTE — ANESTHESIA POSTPROCEDURE EVALUATION
Patient: Shell Prince    Procedure(s):  ROBOTIC-ASSISTED VENTRAL RECTOPEXY    Diagnosis:Rectal prolapse [K62.3]  Diagnosis Additional Information: No value filed.    Anesthesia Type:  General    Note:  Anesthesia Post Evaluation    Patient location during evaluation: Bedside  Patient participation: Able to fully participate in evaluation  Level of consciousness: awake and alert  Pain management: adequate  Airway patency: patent  Cardiovascular status: acceptable  Respiratory status: acceptable  Hydration status: acceptable  PONV: none             Last vitals:  Vitals:    11/18/20 1326 11/18/20 1410 11/18/20 1511   BP: 119/66 136/82 (P) 123/69   Pulse: 75 91 (P) 77   Resp: 14 13    Temp: 35.5  C (95.9  F) 36.5  C (97.7  F)    SpO2: 100% 100% (P) 99%         Electronically Signed By: Sean Flores MD  November 18, 2020  3:17 PM

## 2020-11-18 NOTE — ANESTHESIA PREPROCEDURE EVALUATION
"Anesthesia Pre-Procedure Evaluation    Patient: Shell Prince   MRN: [id number] : 1965          Preoperative Diagnosis: Rectal prolapse [K62.3]    Procedure(s):  ROBOTIC-ASSISTED VENTRAL RECTOPEXY    History reviewed. No pertinent past medical history.  Past Surgical History:   Procedure Laterality Date     ABDOMINOPLASTY       GASTRIC BYPASS         Anesthesia Evaluation     . Pt has had prior anesthetic.            ROS/MED HX    ENT/Pulmonary:  - neg pulmonary ROS    (-) sleep apnea   Neurologic:  - neg neurologic ROS     Cardiovascular:  - neg cardiovascular ROS       METS/Exercise Tolerance:     Hematologic:         Musculoskeletal:         GI/Hepatic:     (+) GERD Asymptomatic on medication, Other GI/Hepatic s/p GBP      Renal/Genitourinary:  - ROS Renal section negative       Endo:  - neg endo ROS       Psychiatric:         Infectious Disease:         Malignancy:         Other:                          Physical Exam  Normal systems: dental    Airway   Mallampati: II  TM distance: >3 FB  Neck ROM: full    Dental     Cardiovascular   Rhythm and rate: regular      Pulmonary    breath sounds clear to auscultation            No results found for: WBC, HGB, HCT, PLT, CRP, SED, NA, POTASSIUM, CHLORIDE, CO2, BUN, CR, GLC, RAYSA, PHOS, MAG, ALBUMIN, PROTTOTAL, ALT, AST, GGT, ALKPHOS, BILITOTAL, BILIDIRECT, LIPASE, AMYLASE, THOMAS, PTT, INR, FIBR, TSH, T4, T3, HCG, HCGS, CKTOTAL, CKMB, TROPN    Preop Vitals  BP Readings from Last 3 Encounters:   20 130/82    Pulse Readings from Last 3 Encounters:   20 75      Resp Readings from Last 3 Encounters:   20 18    SpO2 Readings from Last 3 Encounters:   20 98%      Temp Readings from Last 1 Encounters:   20 36.8  C (98.2  F) (Temporal)    Ht Readings from Last 1 Encounters:   20 1.676 m (5' 6\")      Wt Readings from Last 1 Encounters:   20 81.6 kg (180 lb)    Estimated body mass index is 29.05 kg/m  as calculated from " "the following:    Height as of this encounter: 1.676 m (5' 6\").    Weight as of this encounter: 81.6 kg (180 lb).       Anesthesia Plan      History & Physical Review  History and physical reviewed and following examination; no interval change.    ASA Status:  2 .    NPO Status:  > 8 hours    Plan for General (ETT) with Intravenous and Propofol induction. Maintenance will be Balanced.    PONV prophylaxis:  Ondansetron (or other 5HT-3) and Dexamethasone or Solumedrol         Postoperative Care  Postoperative pain management:  Multi-modal analgesia.      Consents  Anesthetic plan, risks, benefits and alternatives discussed with:  Patient..                 Sean Flores MD  "

## 2020-11-18 NOTE — OR NURSING
OK by [facility] Mark to transfer to floor. Sent with 1 belonging bag, glasses and phone. S/O Wang called with update.

## 2020-11-18 NOTE — BRIEF OP NOTE
[facility]    Brief Operative Note    Pre-operative diagnosis: Rectal prolapse [K62.3]  Post-operative diagnosis Same as pre-operative diagnosis    Procedure: Procedure(s):  ROBOTIC-ASSISTED VENTRAL RECTOPEXY  Surgeon: Surgeon(s) and Role:     * Yari Thomas MD - Primary     * Katie Castillo PA-C - Assisting     * Lisa Farris MD - Assisting  Anesthesia: General   Estimated blood loss: 5cc  Drains: None  Specimens: * No specimens in log *  Findings:   None.  Complications: None.  Implants:   Implant Name Type Inv. Item Serial No.  Lot No. LRB No. Used Action   MESH SLING FLAT RESTORELLE [id number] Mesh MESH SLING FLAT RESTORELLE [id number]  COLOPLAST [id number] N/A 1 Implanted       Condition on discharge from OR: Satisfactory    Katie Castillo PA-C   [facility]   [phone].        ADDENDUM:    PATIENT DATA  Indicate Y or N:  Home O2 No  Hemodialysis  No  Transplant patient  No  Cirrhosis  No  Steroids in last 30 days  No  Immunomodulators in last 30 days  No  Anticoagulation at time of surgery  No   List medication n/a  Prior abdominal surgery  Yes  Pelvic irradiation  No    Albumin within 30 days if known No results found for: ALBUMIN     Hgb within 30 days if known  No results found for: HGB]  Cr within 30 days if known  No results found for: CR]  Body mass index is 29.05 kg/m .      OR DATA  Emergent  No   <24 hours  No   <1 week  No  Bowel Prep Yes  Antibiotics  Yes  DVT prophylaxis    Heparin  Yes   SCD  Yes   None  No  Drain  No  ASA (1,2,3,4) 2  OR time (min) 180  Stents  No  Transfuse >/= 2U  No  Anastomosis   Stapled  No   Handsewn  No  Leak Test    Positive  No   Negative  No   Not done  Yes      Katie Castillo PA-C  [facility]  [phone]

## 2020-11-18 NOTE — OP NOTE
"PREOPERATIVE DIAGNOSIS: Full thickness rectal prolapse     POSTOPERATIVE DIAGNOSIS: same    OPERATION PERFORMED:   1. Robotic ventral rectopexy with sacrocolpopexy     SURGEON: Yari Thomas MD   CO-SURGEONS: Carmen Farris MD  ASSISTANT: Katie Castillo, MARTITA    ANESTHESIA: General.     INDICATION: Shell Prince is a 55 year old  female who presented for evaluation of full thickness rectal prolapse.  She underwent full pelvic floor testing which did demonstrate full-thickness rectal prolapse and low manometry pressures likely due to longstanding rectal prolapse.  The risks and benefits of proceeding with a robotic ventral rectopexy with sacrocolpopexy have been discussed, including the possibility of development of fecal incontinence postoperatively, and she would like to proceed.     OPERATIVE FINDINGS: The patient had a very deep pouch of Darnell and a very redundant sigmoid colon and rectum.  \"T\" shaped Coloplast mesh was used for the rectopexy and sacrocolpopexy which measured 5 cm across, 5 cm long and then was narrowed to 2 cm across for a length of 18 cm.     PROCEDURE IN DETAIL: After informed consent was obtained, the patient was brought to the operating room and placed in the supine position on the operating room table. Sequential compression devices were placed on bilateral lower extremities prior to induction, and general anesthesia was induced without difficulty. She was placed in a well-padded dorsal lithotomy position and IV antibiotics were administered. A Pigasi pink pad was used on the operating room table to prevent her from sliding off the table in steep Trendelenburg position. Her abdomen was sterilely prepped and draped in standard fashion. We entered the abdomen using a standard Stone technique at the umbilicus. Diagnostic laparoscopic then ensued.  A laparoscopic tap block was then performed in 4 quadrants with a total of 30 mL of 0.5% Marcaine. Three additional robotic ports and a " "5mm airseal device were placed under direct visualization. The patient was placed in steep Trendelenburg position.    The sigmoid colon and small bowel was reflected completely out of the pelvis in order to better view the pelvic floor. The patient has a very deep pouch of Darnell, and a lot of laxity in pelvic floor tissues.  The peritoneum distal to where the inferior mesenteric artery was identified was opened using cautery. Eventually, the sacral promontory and the anterior longitudinal ligament was clearly visible and suitable for the mesh to eventually be secured in this location. A 2-0 Ethibond suture was placed in the anterior longitudinal ligament and the needle removed.  The peritoneum was then dissected along the right side of the rectum within the avascular plane. Once the peritoneal reflection had been opened along the right side, this was carried down in the avascular plane, but the dissection did not proceed completely posteriorly. The lateral ligaments remained intact. Small flaps of peritoneum were then raised anteriorly and inferiorly over the vaginal apex.     I then completed the deep pelvic dissection within the rectovaginal septum. I continued to raise the flap inferiorly along the posterior wall of the vagina into the rectovaginal septum. There was a large amount of redundancy and laxity in the tissue in this plane. Dissection proceeded within the rectovaginal septum, down to the level of the levator muscles. The levator muscles were exposed on both sides of the rectum.     This distance between the levators was measured to be 5 cm so a Coloplast mesh was cut to 5cm x 5cm distally and then narrowed to 2cm in a \"T\" shape. The mesh was placed through the right upper quadrant port and laid flat within the pelvis, with the distal portion extending over the anterior portion of the rectum at the level of the levator muscles. 2-0 Ethibond sutures were then used to secure the mesh to the levators on " both sides of the rectum anteriorly. The mesh was then secured to the anterior wall of the rectum using a combination of 3-0 prolene and 2-0 Vicryl sutures. These sutures were placed in seromuscular layers of the rectum. The mesh fully covered the distal rectum and laid flat on the anterior rectal wall. This completed the ventral rectopexy portion of the mesh placement.    I then performed a sacrocolpopexy by securing the mesh to the vaginal apex. There was appropriate laxity between the sutures on the anterior rectal wall and the the sutures to the posterior vagina, without undo tension. The mesh was then secured at the pre-cleared location on the sacral promontory with three 2-0 Ethibond sutures, including the suture placed at the start of the case. These stitches were secured to the anterior longitudinal ligament. The mesh sat comfortably within the pelvis. The mesh was completely reperitonealized by closing the peritoneum with a 3-0 Stratafix suture.    All robotic instruments were removed, the robot undocked and the pneumoperitoneum carefully evacuated. The umbilical port site was closed under direct visualization with a figure-of-eight 0-Vicryl suture. The skin was irrigated, all incisions closed with 4-0 Monocryl suture and the skin dressed with skin glue.     The patient tolerated this procedure well, without complications. The patient was returned to the supine position, extubated in the operating room, and brought to the recovery room in stable condition.     EBL: 10 ml  SPECIMEN: none   COMPLICATIONS: none    Yari Thomas MD

## 2020-11-18 NOTE — ANESTHESIA CARE TRANSFER NOTE
Patient: Shell Prince    Procedure(s):  ROBOTIC-ASSISTED VENTRAL RECTOPEXY    Diagnosis: Rectal prolapse [K62.3]  Diagnosis Additional Information: No value filed.    Anesthesia Type:   General     Note:  Airway :Face Mask  Patient transferred to:PACU  Comments: Transferred to PACU, spontaneous respirations, 10L oxygen via facemask.  All monitors and alarms on and functioning, VSS.  Patient awake, comfortable.  Report to PACU RN.Handoff Report: Identifed the Patient, Identified the Reponsible Provider, Reviewed the pertinent medical history, Discussed the surgical course, Reviewed Intra-OP anesthesia mangement and issues during anesthesia, Set expectations for post-procedure period and Allowed opportunity for questions and acknowledgement of understanding      Vitals: (Last set prior to Anesthesia Care Transfer)    CRNA VITALS  11/18/2020 1051 - 11/18/2020 1126      11/18/2020             Pulse:  96    Ht Rate:  93    SpO2:  98 %    Resp Rate (observed):  (!) 2    Resp Rate (set):  10                Electronically Signed By: CARLOS Nichols CRNA  November 18, 2020  11:26 AM

## 2020-11-18 NOTE — ANESTHESIA PROCEDURE NOTES
----- Message from Bebe Cantu MD sent at 11/16/2017  9:36 PM CST -----  Labs are w/in acceptable range for surgery.  DM and renal function are a little worse.  I do not want to increase her metformin d/t her renal function.  Please encourage weight loss as a means to improve both.  Increase water and avoid NSAIDs.  A1c and bmp needed in 3 months.   Airway   Date/Time: 11/18/2020 7:39 AM   Patient location during procedure: OR    Staff -   Performed By: CRNA    Consent for Airway   Urgency: elective    Indications and Patient Condition  Indications for airway management: na-procedural  Induction type:intravenousMask difficulty assessment: 1 - vent by mask    Final Airway Details  Final airway type: endotracheal airway  Successful airway:ETT - single  Endotracheal Airway Details   ETT size (mm): 7.0  Successful intubation technique: direct laryngoscopy  Grade View of Cords: 1  Adjucts: stylet  Secured at (cm): 22  Secured with: plastic tape    Post intubation assessment   Placement verified by: capnometry, equal breath sounds and chest rise   Number of attempts at approach: 1  Secured with:plastic tape  Ease of procedure: easy  Dentition: Intact and Unchanged

## 2020-11-19 LAB
ANION GAP SERPL CALCULATED.3IONS-SCNC: 7 MMOL/L (ref 3–14)
BUN SERPL-MCNC: 8 MG/DL (ref 7–30)
C DIFF TOX B STL QL: NEGATIVE
CALCIUM SERPL-MCNC: 8.4 MG/DL (ref 8.5–10.1)
CHLORIDE SERPL-SCNC: 105 MMOL/L (ref 94–109)
CO2 SERPL-SCNC: 26 MMOL/L (ref 20–32)
CREAT SERPL-MCNC: 0.78 MG/DL (ref 0.52–1.04)
ERYTHROCYTE [DISTWIDTH] IN BLOOD BY AUTOMATED COUNT: 13.1 % (ref 10–15)
GFR SERPL CREATININE-BSD FRML MDRD: 86 ML/MIN/{1.73_M2}
GLUCOSE SERPL-MCNC: 84 MG/DL (ref 70–99)
HCT VFR BLD AUTO: 36.8 % (ref 35–47)
HGB BLD-MCNC: 12.1 G/DL (ref 11.7–15.7)
MAGNESIUM SERPL-MCNC: 2 MG/DL (ref 1.6–2.3)
MCH RBC QN AUTO: 30.3 PG (ref 26.5–33)
MCHC RBC AUTO-ENTMCNC: 32.9 G/DL (ref 31.5–36.5)
MCV RBC AUTO: 92 FL (ref 78–100)
PHOSPHATE SERPL-MCNC: 3.7 MG/DL (ref 2.5–4.5)
PLATELET # BLD AUTO: 152 10E9/L (ref 150–450)
POTASSIUM SERPL-SCNC: 3.6 MMOL/L (ref 3.4–5.3)
RBC # BLD AUTO: 4 10E12/L (ref 3.8–5.2)
SODIUM SERPL-SCNC: 138 MMOL/L (ref 133–144)
SPECIMEN SOURCE: NORMAL
WBC # BLD AUTO: 6.9 10E9/L (ref 4–11)

## 2020-11-19 PROCEDURE — 250N000013 HC RX MED GY IP 250 OP 250 PS 637: Performed by: COLON & RECTAL SURGERY

## 2020-11-19 PROCEDURE — 36415 COLL VENOUS BLD VENIPUNCTURE: CPT | Performed by: COLON & RECTAL SURGERY

## 2020-11-19 PROCEDURE — 85027 COMPLETE CBC AUTOMATED: CPT | Performed by: COLON & RECTAL SURGERY

## 2020-11-19 PROCEDURE — 250N000013 HC RX MED GY IP 250 OP 250 PS 637: Performed by: PHYSICIAN ASSISTANT

## 2020-11-19 PROCEDURE — 250N000009 HC RX 250: Performed by: COLON & RECTAL SURGERY

## 2020-11-19 PROCEDURE — 87493 C DIFF AMPLIFIED PROBE: CPT | Performed by: COLON & RECTAL SURGERY

## 2020-11-19 PROCEDURE — 84100 ASSAY OF PHOSPHORUS: CPT | Performed by: COLON & RECTAL SURGERY

## 2020-11-19 PROCEDURE — 120N000001 HC R&B MED SURG/OB

## 2020-11-19 PROCEDURE — 250N000011 HC RX IP 250 OP 636: Performed by: COLON & RECTAL SURGERY

## 2020-11-19 PROCEDURE — 83735 ASSAY OF MAGNESIUM: CPT | Performed by: COLON & RECTAL SURGERY

## 2020-11-19 PROCEDURE — 258N000003 HC RX IP 258 OP 636: Performed by: COLON & RECTAL SURGERY

## 2020-11-19 PROCEDURE — 80048 BASIC METABOLIC PNL TOTAL CA: CPT | Performed by: COLON & RECTAL SURGERY

## 2020-11-19 RX ORDER — OXYCODONE HYDROCHLORIDE 5 MG/1
5 TABLET ORAL EVERY 4 HOURS PRN
Qty: 12 TABLET | Refills: 0 | Status: SHIPPED | OUTPATIENT
Start: 2020-11-19

## 2020-11-19 RX ORDER — OXYCODONE HYDROCHLORIDE 5 MG/1
5-10 TABLET ORAL EVERY 4 HOURS PRN
Status: DISCONTINUED | OUTPATIENT
Start: 2020-11-19 | End: 2020-11-20 | Stop reason: HOSPADM

## 2020-11-19 RX ORDER — POLYETHYLENE GLYCOL 3350 17 G/17G
17 POWDER, FOR SOLUTION ORAL DAILY PRN
Status: DISCONTINUED | OUTPATIENT
Start: 2020-11-19 | End: 2020-11-20 | Stop reason: HOSPADM

## 2020-11-19 RX ORDER — OXYCODONE HYDROCHLORIDE 5 MG/1
5 TABLET ORAL EVERY 4 HOURS PRN
Status: DISCONTINUED | OUTPATIENT
Start: 2020-11-19 | End: 2020-11-19

## 2020-11-19 RX ADMIN — OXYCODONE HYDROCHLORIDE 5 MG: 5 TABLET ORAL at 20:28

## 2020-11-19 RX ADMIN — ACETAMINOPHEN 975 MG: 325 TABLET, FILM COATED ORAL at 21:35

## 2020-11-19 RX ADMIN — HYDROMORPHONE HYDROCHLORIDE 0.3 MG: 1 INJECTION, SOLUTION INTRAMUSCULAR; INTRAVENOUS; SUBCUTANEOUS at 02:53

## 2020-11-19 RX ADMIN — THERA TABS 1 TABLET: TAB at 08:40

## 2020-11-19 RX ADMIN — ACETAMINOPHEN 975 MG: 325 TABLET, FILM COATED ORAL at 05:59

## 2020-11-19 RX ADMIN — OXYCODONE HYDROCHLORIDE 5 MG: 5 TABLET ORAL at 16:28

## 2020-11-19 RX ADMIN — HYDROMORPHONE HYDROCHLORIDE 0.5 MG: 1 INJECTION, SOLUTION INTRAMUSCULAR; INTRAVENOUS; SUBCUTANEOUS at 04:38

## 2020-11-19 RX ADMIN — SODIUM CHLORIDE, POTASSIUM CHLORIDE, SODIUM LACTATE AND CALCIUM CHLORIDE: 600; 310; 30; 20 INJECTION, SOLUTION INTRAVENOUS at 01:51

## 2020-11-19 RX ADMIN — OMEPRAZOLE 20 MG: 20 CAPSULE, DELAYED RELEASE ORAL at 08:40

## 2020-11-19 RX ADMIN — CEFOTETAN DISODIUM 2 G: 2 INJECTION, POWDER, FOR SOLUTION INTRAMUSCULAR; INTRAVENOUS at 08:39

## 2020-11-19 RX ADMIN — HYDROMORPHONE HYDROCHLORIDE 0.5 MG: 1 INJECTION, SOLUTION INTRAMUSCULAR; INTRAVENOUS; SUBCUTANEOUS at 08:40

## 2020-11-19 RX ADMIN — OXYCODONE HYDROCHLORIDE 5 MG: 5 TABLET ORAL at 11:56

## 2020-11-19 RX ADMIN — AMITRIPTYLINE HYDROCHLORIDE 50 MG: 50 TABLET, FILM COATED ORAL at 21:35

## 2020-11-19 RX ADMIN — ACETAMINOPHEN 975 MG: 325 TABLET, FILM COATED ORAL at 13:21

## 2020-11-19 ASSESSMENT — ACTIVITIES OF DAILY LIVING (ADL)
ADLS_ACUITY_SCORE: 17
ADLS_ACUITY_SCORE: 19

## 2020-11-19 NOTE — PLAN OF CARE
POD 1: A&Ox4.  VSS; 1L NC while asleep overnight but was able to wean to RA.  Abdominal pain controlled with PRN Dilaudid/scheduled Tylenol.  Ambulated several times this shift to bathroom/in hallways; SBA with IV pole.  PIV infusing LR @ 75 mL/hr; intermittent antibiotics.  5 lap sites with liquid bandage, MADDI; CDI.  BS hypoactive; - flatus per patient report.  Tolerating full liquid diet without difficulty.  Discharge pending progress.

## 2020-11-19 NOTE — PLAN OF CARE
Pt is A and O X4. VSS on RA O2. Reports 6/10 Abd incision pain: prn 0.5 mg dialudid x 2, effective. Capno: 10, 36. Lungs clear. Bowels hypo, no flatus. 5 Abd lap sites, all  CDI.  Full liq diet, tolerating well.  PIV infusing LR at 75 ml/hr+ IV abx. Voiding without difficulties. PVR: 54. Ambulated in room and bathroom x 2, tolerated well with slight dizziness.  Discharge pending. Continue to monitor

## 2020-11-19 NOTE — PROGRESS NOTES
[facility]  PROGRESS NOTE    November 19, 2020  Post-op Day # 1    SUBJECTIVE:  Pain controlled. Tolerating liquids. Voiding. Tenderness at umbilical incision    OBJECTIVE:  Temp:  [95.9  F (35.5  C)-98.6  F (37  C)] 98.1  F (36.7  C)  Pulse:  [57-93] 80  Resp:  [10-26] 15  BP: ()/(58-98) 132/77  SpO2:  [94 %-100 %] 96 %    Intake/Output Summary (Last 24 hours) at 11/19/2020 0645  Last data filed at 11/18/2020 1300  Gross per 24 hour   Intake 1830 ml   Output 110 ml   Net 1720 ml       GENERAL:  Awake, alert, no acute distress  EXTREMITIES: Warm and well perfused, no edema   ABDOMEN:  Soft, appropriately tender, non-distended. No guarding, rigidity, or peritoneal signs.  INCISION:  C/d/i with glue     LABS:  No results found for: WBC  Lab Results   Component Value Date    HGB 13.5 11/18/2020       ASSESSMENT/PLAN: Shell Prince is a 55 year old female POD # 1 s/p robotic ventral rectopexy.     Regular diet. Stop IVF.   Multimodal pain control - scheduled tylenol, PRN oycodone.   Abdominal binder for comfort.   Miralax PRN if no bowel movement for 48 hours or if hard stools to avoid pushing/straining.   OOB, ambulate  Likely home today if continues to progress well.     For questions/paging, please contact the CRS office at [phone].    Yari Thomas MD  [facility]   Office: [phone]  11/19/2020   6:45 AM

## 2020-11-20 VITALS
TEMPERATURE: 98.9 F | BODY MASS INDEX: 28.93 KG/M2 | HEART RATE: 103 BPM | DIASTOLIC BLOOD PRESSURE: 69 MMHG | RESPIRATION RATE: 18 BRPM | SYSTOLIC BLOOD PRESSURE: 120 MMHG | WEIGHT: 180 LBS | OXYGEN SATURATION: 96 % | HEIGHT: 66 IN

## 2020-11-20 PROCEDURE — 250N000013 HC RX MED GY IP 250 OP 250 PS 637: Performed by: COLON & RECTAL SURGERY

## 2020-11-20 PROCEDURE — 250N000013 HC RX MED GY IP 250 OP 250 PS 637: Performed by: PHYSICIAN ASSISTANT

## 2020-11-20 RX ADMIN — OXYCODONE HYDROCHLORIDE 5 MG: 5 TABLET ORAL at 04:13

## 2020-11-20 RX ADMIN — ACETAMINOPHEN 975 MG: 325 TABLET, FILM COATED ORAL at 05:46

## 2020-11-20 RX ADMIN — THERA TABS 1 TABLET: TAB at 09:20

## 2020-11-20 RX ADMIN — OMEPRAZOLE 20 MG: 20 CAPSULE, DELAYED RELEASE ORAL at 09:19

## 2020-11-20 RX ADMIN — OXYCODONE HYDROCHLORIDE 5 MG: 5 TABLET ORAL at 09:19

## 2020-11-20 ASSESSMENT — ACTIVITIES OF DAILY LIVING (ADL)
ADLS_ACUITY_SCORE: 17

## 2020-11-20 NOTE — PROGRESS NOTES
COLON & RECTAL SURGERY  PROGRESS NOTE    November 20, 2020  Post-op Day # 2    SUBJECTIVE:  Sitting up in bed. Ready to go home. Pain controlled. Tolerated regular diet.     OBJECTIVE:  Temp:  [97.4  F (36.3  C)-99.6  F (37.6  C)] 97.4  F (36.3  C)  Pulse:  [84-89] 84  Resp:  [15-16] 16  BP: (101-127)/(66-79) 118/78  SpO2:  [94 %-98 %] 98 %    Intake/Output Summary (Last 24 hours) at 11/20/2020 0733  Last data filed at 11/19/2020 1200  Gross per 24 hour   Intake 360 ml   Output --   Net 360 ml       GENERAL:  Awake, alert, no acute distress  HEAD: Normocephalic atraumatic  SCLERA: Anicteric  EXTREMITIES: Warm and well perfused  ABDOMEN:  Soft, appropriately tender, non-distended. No guarding, rigidity, or peritoneal signs.   INCISION:  C/d/i    LABS:  Lab Results   Component Value Date    WBC 6.9 11/19/2020     Lab Results   Component Value Date    HGB 12.1 11/19/2020     Lab Results   Component Value Date    HCT 36.8 11/19/2020     Lab Results   Component Value Date     11/19/2020     Last Basic Metabolic Panel:  Lab Results   Component Value Date     11/19/2020      Lab Results   Component Value Date    POTASSIUM 3.6 11/19/2020     Lab Results   Component Value Date    CHLORIDE 105 11/19/2020     Lab Results   Component Value Date    RAYSA 8.4 11/19/2020     Lab Results   Component Value Date    CO2 26 11/19/2020     Lab Results   Component Value Date    BUN 8 11/19/2020     Lab Results   Component Value Date    CR 0.78 11/19/2020     Lab Results   Component Value Date    GLC 84 11/19/2020       ASSESSMENT/PLAN: Shell Prince is a 55 year old female POD # 1 s/p robotic ventral rectopexy.      1. Regular diet  2. PRN pain meds  3. Discharge this morning    Discussed with Dr. Thomas.     For questions/paging, please contact the CRS office at [phone].    Katie Castillo PA-C  Colorectal Physician Assistant    [facility]  [address]  [place] [address]  T:  [phone]  F: [phone]

## 2020-11-20 NOTE — PLAN OF CARE
POD#2:  A&Ox4.  VSS, RA.  Complained of abdominal pain; controlled with PRN oxycodone/scheduled Tylenol.  PIV SL.  Voiding adequately; several loose stools this shift (c-diff neg).  5 lap sites with liquid bandage; MADDI, CDI.  Tolerating regular diet without problems.  Independent.  Discharge pending progress.

## 2020-11-20 NOTE — PLAN OF CARE
POD1: Pt is A and O X4. VSS on RA O2, ex Tmax 99.6. Reports 2-5/10 Abd incision pain: prn 0.5 mg dialudid x 1, effective, Oxy x 2. Lungs clear. Bowels active, + flatus. Pt had 4 loose BMs this evening. Md notified and order for stool sample placed. Abd lap sites, all  CDI.  Regular diet,  tolerating well.  IV fluids discontinued by provider. Pt taking good oral intake. Voiding without difficulties.  Tolerating, activity well. SBA.   Discharge most likely tomorrow. Continue to monitor

## 2020-11-20 NOTE — PLAN OF CARE
Patient discharged at 10:30am to Discharged to home  IV was discontinued. Pain at time of discharge was 0/10. Belongings returned to patient.  Discharge instructions and medications reviewed with patient.  Patient verbalized understanding and all questions were answered.  Prescriptions given to patient.  At time of discharge, patient condition was stable and left the unit escorted by CNA.

## 2020-11-23 NOTE — DISCHARGE SUMMARY
[facility] Discharge Summary      Shell Prince MRN# [id number]   Age: 55 year old YOB: 1965     Date of Admission:  11/18/2020  Date of Discharge::  11/20/2020 10:50 AM  Admitting Physician:  Yari Thomas MD  Discharge Physician:  Yari Thomas MD     PCP:  Ofelia Madden    Disposition: Patient discharged from [facility] to home in stable condition.        Primary Diagnosis:   Rectal prolapse            Discharge Medications:     Discharge Medication List as of 11/20/2020  9:37 AM      START taking these medications    Details   oxyCODONE (ROXICODONE) 5 MG tablet Take 1 tablet (5 mg) by mouth every 4 hours as needed for moderate to severe pain, Disp-12 tablet, R-0, E-Prescribe         CONTINUE these medications which have NOT CHANGED    Details   amitriptyline (ELAVIL) 50 MG tablet Take 50 mg by mouth At Bedtime, Historical      Cyanocobalamin (B-12) 1000 MCG TBCR Take 1,000 mcg by mouth, Historical      multivitamin, therapeutic (THERA-VIT) TABS tablet Take 1 tablet by mouth daily, Historical      omeprazole 20 MG tablet Take 20 mg by mouth every morning, Historical      ondansetron (ZOFRAN-ODT) 4 MG ODT tab Take 4 mg by mouth every 8 hours as needed for nausea, Historical         STOP taking these medications       metroNIDAZOLE (FLAGYL) 500 MG tablet Comments:   Reason for Stopping:         neomycin (MYCIFRADIN) 500 MG tablet Comments:   Reason for Stopping:                      Follow Up, Special Instructions:     Discharge diet: Regular   Discharge activity: Activity restricted to 10lbs   Discharge follow-up: Follow up with Dr. Thomas in 3-4 weeks   Wound care: May get incision wet in shower but do not soak or scrub              Procedures:     Procedure(s): Robotic ventral rectopexy with mesh       No other procedures performed during this admission            Consultations:   None          Brief Hospital Summary:     Patient is a 55  year old female who underwent robotic ventral rectopexy on 11/18/20 by Dr. Thomas.   There were no immediate complications during this procedure.    Please refer to the full operative summary for details.  The patient's hospital course was unremarkable.  Pain was controlled on oral pain regimen.  She was tolerating a low fiber diet.  Bowel function had returned prior to discharge.  She recovered as anticipated and experienced no post-operative complications.         Attestation:  I have reviewed today's vital signs, notes, medications, labs and imaging.    Katie Castillo PA-C  Colorectal Physician Assistant    [facility]  [address] [facility] [address]  [place] [address]  T: [phone]  F: [phone]            ADDENDUM:  Length of stay: 2 days  Indicate Y or N for the following:  UTI  No  C diff  No  PNA  No  SSI No  DVT No  PE  No  CVA No  MI No  Enterocutaneous fistula  No  Peripheral nerve injury  No  Abscess (not adjacent to anastomosis)  No  Leak No    Treated with:   Antibiotics NO   Drain  NO   Reoperation  NO  Death within 30 days No  Reintubation  No  Reoperation  No   Procedure n/a

## 2021-05-30 ENCOUNTER — RECORDS - HEALTHEAST (OUTPATIENT)
Dept: ADMINISTRATIVE | Facility: CLINIC | Age: 56
End: 2021-05-30

## 2021-05-30 NOTE — TELEPHONE ENCOUNTER
Refill Approved    Rx renewed per Medication Renewal Policy. Medication was last renewed on 11/30/18  Last OV:  1/24/19.    Dolly Encinas, [facility] Triage/Med Refill 7/24/2019     Requested Prescriptions   Pending Prescriptions Disp Refills     omeprazole (PRILOSEC) 40 MG capsule [Pharmacy Med Name: OMEPRAZOLE 40MG CAPSULES] 90 capsule 1     Sig: Take 1 capsule (40 mg total) by mouth daily.       GI Medications Refill Protocol Passed - 7/23/2019 12:47 PM        Passed - PCP or prescribing provider visit in last 12 or next 3 months.     Last office visit with prescriber/PCP: 1/24/2019 Hugo Newton MD OR same dept: 1/24/2019 Hugo Newton MD OR same specialty: 1/24/2019 Hugo Newton MD  Last physical: 9/6/2018 Last MTM visit: Visit date not found   Next visit within 3 mo: Visit date not found  Next physical within 3 mo: Visit date not found  Prescriber OR PCP: Hugo Newton MD  Last diagnosis associated with med order: 1. GERD (gastroesophageal reflux disease)  - omeprazole (PRILOSEC) 40 MG capsule [Pharmacy Med Name: OMEPRAZOLE 40MG CAPSULES]; TAKE 1 CAPSULE BY MOUTH DAILY  Dispense: 90 capsule; Refill: 0    If protocol passes may refill for 12 months if within 3 months of last provider visit (or a total of 15 months).

## 2021-05-31 ENCOUNTER — RECORDS - HEALTHEAST (OUTPATIENT)
Dept: ADMINISTRATIVE | Facility: CLINIC | Age: 56
End: 2021-05-31

## 2021-05-31 VITALS — BODY MASS INDEX: 29.7 KG/M2 | WEIGHT: 184 LBS

## 2021-05-31 VITALS — HEIGHT: 66 IN | BODY MASS INDEX: 29.86 KG/M2 | WEIGHT: 185.8 LBS

## 2021-05-31 VITALS — WEIGHT: 186 LBS | BODY MASS INDEX: 30.02 KG/M2

## 2021-06-01 NOTE — TELEPHONE ENCOUNTER
Refill Approved    Rx renewed per Medication Renewal Policy. Medication was last renewed on 11/18/18, last OV 1/24/19.    Luz Mcintyre, [facility] Triage/Med Refill 9/29/2019     Requested Prescriptions   Pending Prescriptions Disp Refills     amitriptyline (ELAVIL) 50 MG tablet [Pharmacy Med Name: AMITRIPTYLINE 50MG TABLETS] 180 tablet 3     Sig: TAKE 2 TABLETS BY MOUTH EVERY DAY AT BEDTIME       Tricyclics/Misc Antidepressant/Antianxiety Meds Refill Protocol Passed - 9/28/2019  7:15 PM        Passed - PCP or prescribing provider visit in last year     Last office visit with prescriber/PCP: 1/24/2019 Hugo Newton MD OR same dept: 1/24/2019 Hugo Newton MD OR same specialty: 1/24/2019 Hugo Newton MD  Last physical: 9/6/2018 Last MTM visit: Visit date not found   Next visit within 3 mo: Visit date not found  Next physical within 3 mo: Visit date not found  Prescriber OR PCP: Hugo Newton MD  Last diagnosis associated with med order: 1. Insomnia  - amitriptyline (ELAVIL) 50 MG tablet [Pharmacy Med Name: AMITRIPTYLINE 50MG TABLETS]; TAKE 2 TABLETS BY MOUTH EVERY DAY AT BEDTIME  Dispense: 180 tablet; Refill: 3    If protocol passes may refill for 12 months if within 3 months of last provider visit (or a total of 15 months).

## 2021-06-02 VITALS — WEIGHT: 182 LBS | HEIGHT: 66 IN | BODY MASS INDEX: 29.25 KG/M2

## 2021-06-02 VITALS — WEIGHT: 181 LBS | BODY MASS INDEX: 29.09 KG/M2 | HEIGHT: 66 IN

## 2021-06-02 VITALS — BODY MASS INDEX: 31.31 KG/M2 | WEIGHT: 194 LBS

## 2021-06-04 NOTE — TELEPHONE ENCOUNTER
Refill Approved    Rx renewed per Medication Renewal Policy. Medication was last renewed on 9/29/19.    Ellie Obrien, [facility] Triage/Med Refill 12/19/2019     Requested Prescriptions   Pending Prescriptions Disp Refills     amitriptyline (ELAVIL) 50 MG tablet [Pharmacy Med Name: AMITRIPTYLINE 50MG TABLETS] 180 tablet 0     Sig: TAKE 2 TABLETS BY MOUTH EVERY DAY AT BEDTIME       Tricyclics/Misc Antidepressant/Antianxiety Meds Refill Protocol Passed - 12/16/2019  1:29 PM        Passed - PCP or prescribing provider visit in last year     Last office visit with prescriber/PCP: 1/24/2019 Hugo Newton MD OR same dept: 1/24/2019 Hugo Newton MD OR same specialty: 1/24/2019 Hugo Newton MD  Last physical: 9/6/2018 Last MTM visit: Visit date not found   Next visit within 3 mo: Visit date not found  Next physical within 3 mo: Visit date not found  Prescriber OR PCP: Hugo Newton MD  Last diagnosis associated with med order: 1. Insomnia  - amitriptyline (ELAVIL) 50 MG tablet [Pharmacy Med Name: AMITRIPTYLINE 50MG TABLETS]; TAKE 2 TABLETS BY MOUTH EVERY DAY AT BEDTIME  Dispense: 180 tablet; Refill: 0    If protocol passes may refill for 12 months if within 3 months of last provider visit (or a total of 15 months).

## 2021-06-04 NOTE — TELEPHONE ENCOUNTER
RN cannot approve Refill Request    RN can NOT refill this medication No current HE PCP.    Herminia Daley, [facility] Triage/Med Refill 1/5/2020    Requested Prescriptions   Pending Prescriptions Disp Refills     omeprazole (PRILOSEC) 40 MG capsule [Pharmacy Med Name: OMEPRAZOLE 40MG CAPSULES] 90 capsule 1     Sig: TAKE ONE CAPSULE BY MOUTH DAILY.       GI Medications Refill Protocol Passed - 1/3/2020  8:03 AM        Passed - PCP or prescribing provider visit in last 12 or next 3 months.     Last office visit with prescriber/PCP: 1/24/2019 Hugo Newton MD OR same dept: 1/24/2019 Hugo Newton MD OR same specialty: 1/24/2019 Hugo Newton MD  Last physical: 9/6/2018 Last MTM visit: Visit date not found   Next visit within 3 mo: Visit date not found  Next physical within 3 mo: Visit date not found  Prescriber OR PCP: Hugo Newton MD  Last diagnosis associated with med order: 1. GERD (gastroesophageal reflux disease)  - omeprazole (PRILOSEC) 40 MG capsule [Pharmacy Med Name: OMEPRAZOLE 40MG CAPSULES]; TAKE ONE CAPSULE BY MOUTH DAILY.  Dispense: 90 capsule; Refill: 1    If protocol passes may refill for 12 months if within 3 months of last provider visit (or a total of 15 months).

## 2021-06-06 NOTE — TELEPHONE ENCOUNTER
RN cannot approve Refill Request    RN can NOT refill this medication Protocol failed and NO refill given.       Ellie Obrien, [facility] Triage/Med Refill 3/5/2020    Requested Prescriptions   Pending Prescriptions Disp Refills     amitriptyline (ELAVIL) 50 MG tablet [Pharmacy Med Name: AMITRIPTYLINE 50MG TABLETS] 180 tablet 3     Sig: TAKE 2 TABLETS BY MOUTH EVERY DAY AT BEDTIME       Tricyclics/Misc Antidepressant/Antianxiety Meds Refill Protocol Failed - 3/5/2020  8:57 AM        Failed - PCP or prescribing provider visit in last year     Last office visit with prescriber/PCP: 1/24/2019 Hugo Newton MD OR same dept: Visit date not found OR same specialty: 1/24/2019 Hugo Newton MD  Last physical: 9/6/2018 Last MTM visit: Visit date not found   Next visit within 3 mo: Visit date not found  Next physical within 3 mo: Visit date not found  Prescriber OR PCP: Hugo Newton MD  Last diagnosis associated with med order: 1. Insomnia  - amitriptyline (ELAVIL) 50 MG tablet [Pharmacy Med Name: AMITRIPTYLINE 50MG TABLETS]; TAKE 2 TABLETS BY MOUTH EVERY DAY AT BEDTIME  Dispense: 180 tablet; Refill: 0    If protocol passes may refill for 12 months if within 3 months of last provider visit (or a total of 15 months).

## 2021-06-08 NOTE — TELEPHONE ENCOUNTER
Please contact this patient she has not been seen for a year and a half.  Please schedule a virtual visit with me.  Video preferred    Let her know that I did send a refill for the omeprazole

## 2021-06-10 NOTE — TELEPHONE ENCOUNTER
Left message #2 at [phone]. Sending letter out and postponing task out to 2 weeks and will try again if an appointment hasn't been made.

## 2021-06-10 NOTE — TELEPHONE ENCOUNTER
Patient Returning Call  Reason for call:  Message from clinic  Information relayed to patient:  Patient needs office visit for refills.  Patient has additional questions:  Yes  If YES, what are your questions/concerns:  Patient has changed primary care clinic and doctor.  Please disregard any refills that come to Dr. Newton.  Okay to leave a detailed message?: No call back needed

## 2021-06-12 NOTE — PROGRESS NOTES
Subjective: This patient comes in to discuss a few medical issues.    She has sleep apnea prior to her weight loss surgery 4 years ago she is having symptoms again she feels some daytime fatigue she is not sure if she snores but feels like she wakes up a lot and catches herself with some difficulty breathing in through the back of her throat.  She had a normal exam here through the nose and throat.  She will go to the sleep specialist and get that evaluated consider ENT.    Patient needed a referral for mammogram this was given she had that done today and that was normal.    She has had previous muscle cramps and uses Flexeril as needed I discussed trying to stay hydrated we will check her potassium and magnesium in the past as well and those were normal.    Patient has had a couple musculoskeletal problems her left ankle gives her pain occasionally he will be fairly severe it is just below the lateral malleolus I did get an x-ray here today of that please see below.    Also her left hand gives her pain in through the palmar aspect along the thenar prominence.  She will get some pain with some flexion of the thumb and index finger.  I also get an x-ray of this please see below      Tobacco status: She  reports that she has never smoked. She has never used smokeless tobacco.    Patient Active Problem List    Diagnosis Date Noted     Abnormal Pap smear 05/13/2015     Insomnia 04/30/2015     Tricuspid Regurgitation      Tachycardia      Secondary Hyperparathyroidism      Primary Hyperparathyroidism Due To Adenoma        Current Outpatient Prescriptions   Medication Sig Dispense Refill     amitriptyline (ELAVIL) 50 MG tablet TAKE TWO TABLETS BY MOUTH EVERY DAY AT BEDTIME 180 tablet 2     cyclobenzaprine (FLEXERIL) 10 MG tablet Take 10 mg by mouth 2 (two) times a day as needed for muscle spasms.       omeprazole (PRILOSEC) 40 MG capsule TAKE 1 CAPSULE (40 MG TOTAL) BY MOUTH DAILY. 90 capsule 3     No current  facility-administered medications for this visit.        ROS:   10 point review of systems positive as outlined otherwise negative    Objective:    /68 (Patient Site: Left Arm, Patient Position: Sitting, Cuff Size: Adult Regular)  Pulse 88  Resp 16  Wt 186 lb (84.4 kg)  LMP 01/11/2015  BMI 30.02 kg/m2  Body mass index is 30.02 kg/(m^2).      General appearance no acute distress    Vital signs are stable.    Oropharynx was clear nose was clear neck was negative    Lungs clear no rales or rhonchi    Heart was regular rate in the 80s.    Left thumb with tenderness over the palmar aspect and some discomfort with flexion x-ray was negative.    Left ankle with some pain to the distal lateral malleolus no swelling negative ankle drawer sign Achilles was intact    X-ray showed some tibial talar spurring otherwise unremarkable.    Calf was negative negative Homans sign.        Assessment:  1. Sleep apnea  Ambulatory referral to Sleep Medicine   2. Visit for screening mammogram  Mammo Screening Bilateral   3. Thumb pain, left  XR Hand Left 3 or More VWS   4. Ankle pain, left  XR Ankle Left 3 or More VWS     Reevaluation for sleep apnea had this prior to her weight loss surgery.    Mammogram came back negative    Muscle cramps improved with the Flexeril okay to use as needed stay hydrated    Left thumb pain with flexion negative x-ray consider seeing hand surgeon    Left ankle pain lateral malleolus distal consider seeing ortho    Plan: As outlined above    This transcription uses voice recognition software, which may contain typographical errors.    Patient's x-rays of her hand and ankle were unremarkable no correlating abnormalities on the films.    She will monitor symptoms ,if persisting, will have her see ortho

## 2021-06-12 NOTE — PROGRESS NOTES
Dear Dr. Hugo Newton Md  [address] [place] [id number]    Thank you for the opportunity to participate in the care of Ms. Shell Prince.    She is a 51 y.o. female who comes to the clinic with a chief complaint of excessive daytime sleepiness that is been going on for approximately 2 years.  The patient was diagnosed with obstructive sleep apnea approximately 10 years ago and started on CPAP therapy.  She subsequently underwent gastric bypass and lost a significant amount of weight.  She subsequently discontinued CPAP therapy because she felt she did not need it anymore.  However she has gained some of her weight back and is now feeling excessively tired.  While she denies any episodes of witnessed apnea, she has been snoring again.  Her review of system is otherwise unremarkable.    Past Medical History  History reviewed. No pertinent past medical history.     Past Surgical History  Past Surgical History:   Procedure Laterality Date     KS GASTRIC BYPASS,OBESE<150CM KRIS-EN-Y      Description: Gastric Surgery For Morbid Obesity Bypass With Kris-en-Y;  Recorded: 02/14/2008;        Meds  Current Outpatient Prescriptions   Medication Sig Dispense Refill     amitriptyline (ELAVIL) 50 MG tablet TAKE TWO TABLETS BY MOUTH EVERY DAY AT BEDTIME 180 tablet 2     cyclobenzaprine (FLEXERIL) 10 MG tablet Take 10 mg by mouth 2 (two) times a day as needed for muscle spasms.       omeprazole (PRILOSEC) 40 MG capsule TAKE 1 CAPSULE (40 MG TOTAL) BY MOUTH DAILY. 90 capsule 3     No current facility-administered medications for this visit.         Allergies  Tetracyclines     Social History  Social History     Social History     Marital status: Single     Spouse name: N/A     Number of children: N/A     Years of education: N/A     Occupational History     Not on file.     Social History Main Topics     Smoking status: Never Smoker     Smokeless tobacco: Never Used     Alcohol use No     Drug use: Not on file     Sexual  activity: Not on file     Other Topics Concern     Not on file     Social History Narrative        Family History  Family History   Problem Relation Age of Onset     Sleep apnea Sister      Sleep apnea Sister         Review of Systems:  Constitutional: Negative except as noted in HPI.   Eyes: Negative except as noted in HPI.   ENT: Negative except as noted in HPI.   Cardiovascular: Negative except as noted in HPI.   Respiratory: Negative except as noted in HPI.   Gastrointestinal: Negative except as noted in HPI.   Genitourinary: Negative except as noted in HPI.   Musculoskeletal: Negative except as noted in HPI.   Integumentary: Negative except as noted in HPI.   Neurological: Negative except as noted in HPI.   Psychiatric: Negative except as noted in HPI.   Endocrine: Negative except as noted in HPI.   Hematologic/Lymphatic: Negative except as noted in HPI.      STOP BANG 8/22/2017   Do you snore loudly (louder than talking or loud enough to be heard through closed doors)? 0   Do you often feel tired, fatigued, or sleepy during daytime? 1   Has anyone observed you stop breathing in your sleep? 0   Do you have or are you being treated for high blood pressure? 0   BMI more than 35 kg/m2 0   Age over 50 years old? 1   Neck circumference greater than 16 inches? 0   Gender male? 0   Total Score 2   Epworths Sleepiness Scale 8/22/2017   Sitting and reading 1   Watching TV 1   Sitting, inactive in a public place (e.g. a theatre or a meeting) 0   As a passenger in a car for an hour without a break 0   Lying down to rest in the afternoon when circumstances permit 2   Sitting and talking to someone 0   Sitting quietly after a lunch without alcohol 1   In a car, while stopped for a few minutes in traffic 0   Total score 5   Rooming 8/22/2017   Usual bedtime 10   Sleep Latency 30 -60 mn   Awakenings 3-4   Wake Up Time 630   Energy Drinks 0   Coffee 2   Cola 0   Difficulty falling asleep Yes   Difficulty staying asleep Yes  "  Excessive daytime tiredness Yes   Excessive daytime sleepiness Yes   Dozing off while driving No   Shift Worker No   Sleep Walking? No   Sleep Talking? No   Kicking or punching? No   Restless legs symptoms No       Physical Exam:  /66  Pulse 91  Ht 5' 6\" (1.676 m)  Wt 185 lb 12.8 oz (84.3 kg)  LMP 01/11/2015  SpO2 97%  BMI 29.99 kg/m2  BMI:Body mass index is 29.99 kg/(m^2).   GEN: NAD, appropriate for age  Head: Normocephalic.  EYES: PERRLA, EOMI  ENT: Oropharynx is clear, mallampatti class 3+ airway. Uvula is intact  Nasal mucosa is moist without erythema  Neck : Thyroid is within normal limits. Neck circ 12.5 inches  CV: Regular rate and rhythm, S1 & S2 positive.  LUNGS: Bilateral breathsounds heard.   ABDOMEN: Positive bowel sounds in all quadrants, soft, no rebound or guarding  MUSCULOSKELETAL: Bilateral trace leg swelling  SKIN: warm, dry, no rashes  Neurological: Alert, oriented to time, place, and person.  Psych: normal mood, normal affect     Labs/Studies:     Lab Results   Component Value Date    WBC 4.2 04/30/2015    HGB 13.9 04/30/2015    HCT 41.4 04/30/2015    MCV 92 04/30/2015     04/30/2015         Chemistry        Component Value Date/Time     04/30/2015 0859    K 4.5 09/03/2015 1454     04/30/2015 0859    CO2 27 04/30/2015 0859    BUN 15 04/30/2015 0859    CREATININE 0.82 04/30/2015 0859    GLU 84 04/30/2015 0859        Component Value Date/Time    CALCIUM 9.7 09/03/2015 1454    ALKPHOS 66 04/30/2015 0859    AST 38 04/30/2015 0859    ALT 59 (H) 04/30/2015 0859    BILITOT 0.4 04/30/2015 0859            Lab Results   Component Value Date    FERRITIN 16 11/14/2012     Lab Results   Component Value Date    TSH 1.2 01/27/2014         Assessment and Plan:  In summary Shell Prince is a 51 y.o. year old female here for sleep disturbance.  1.  Hypersomnia   Ms. Shell Prince has high risk for obstructive sleep apnea based on the history of hypersomnia, snoring and a " crowded airway. I educated the patient on the underlying pathophysiology of obstructive sleep apnea. We reviewed the risks associated with sleep apnea, including increased cardiovascular risk and overall death. We talked about treatments briefly. I recommend getting a Home sleep study. The patient should return to the clinic to discuss results and treatment option in a patient-centered approach.  2.  Snoring  3.  Other sleep disturbance    Patient verbalized understanding of these issues, agrees with the plan and all questions were answered today. Patient was given an opportuntity to voice any other symptoms or concerns not listed above. Patient did not have any other symptoms or concerns.      Patient told to return in one week after the sleep study is interpreted. Patient instructed to stop at  to schedule appointment before leaving today.       Chaka Langston DO  Board Certified in Internal Medicine and Sleep Medicine  [facility].    (Note created with Dragon voice recognition and unintended spelling errors and word substitutions may occur)

## 2021-06-14 NOTE — PROGRESS NOTES
Subjective:      Shell Prince is a 52 y.o. female who presents for evaluation of bilateral leg pain.  She has had pain in her bilateral anterior legs for about 1.5 weeks.  Symptoms are not improving at all.  This started after she was moving into a new house.  She says during the moving, she was walking a lot and climbing a lot of stairs.  She has not really had a lot of exertion since then.  She primarily sits when she is at work.    She now is having significant burning type pain present from the bilateral anterior ankles to below the knees on both legs.  Left side is worse than right.  Symptoms can come and go.  Symptoms somewhat better with rest.  No obvious med changes or other or other triggers, other than moving, as mentioned above.  Pain is worse when she is not wearing shoes.  However, different shoes, such as high heels versus sneakers, do not seem to make a difference.  No significant old injuries to her ankles.  She notes that she was having some problems with muscle spasms recently, lab workup was normal, reviewed today.  She is taking 400-600 mg of Advil as needed for pain, and that did not seem to help.  No numbness or tingling in the lower extremities.    Patient Active Problem List   Diagnosis     Tricuspid Regurgitation     Tachycardia     Secondary Hyperparathyroidism     Primary Hyperparathyroidism Due To Adenoma     Insomnia     Abnormal Pap smear       Current Outpatient Prescriptions:      amitriptyline (ELAVIL) 50 MG tablet, TAKE TWO TABLETS BY MOUTH EVERY DAY AT BEDTIME, Disp: 180 tablet, Rfl: 2     cyclobenzaprine (FLEXERIL) 10 MG tablet, Take 1 tablet (10 mg total) by mouth 2 (two) times a day as needed for muscle spasms., Disp: 60 tablet, Rfl: 1     omeprazole (PRILOSEC) 40 MG capsule, TAKE 1 CAPSULE (40 MG TOTAL) BY MOUTH DAILY., Disp: 90 capsule, Rfl: 3     Objective:     Allergies   Allergen Reactions     Tetracyclines      Vitals:    12/18/17 1322   BP: 100/60   Pulse: (!) 104    Resp: 20     Body mass index is 29.7 kg/(m^2).    Vitals reviewed as above.  General: Patient is alert and oriented x 3, in no apparent distress  Cardiac: Regular rate and rhythm, no murmurs  Pulmonary: lungs clear to ausculation, no crackles, rales, rhonchi, or wheezing  Musculoskeletal: normal 4/5 strength in major muscle groups in lower extremities bilaterally, normal foot exam bilaterally, feet are warm and pink, full active range of motion of bilateral toes, patient has moderate pain triggered in the anterior shins with any range of motion direction of her ankles flexion/extension, no pain with palpation of the tarsals or metatarsals, no ligament laxity noted in either ankle, no pain with palpation at the bottom of both heels, no edema or abrasions, patient walks a normal tandem gait    Results for orders placed or performed in visit on 09/03/15   Calcium   Result Value Ref Range    Calcium 9.7 8.5 - 10.5 mg/dL   CK Total   Result Value Ref Range    CK, Total 144 30 - 190 U/L   Magnesium   Result Value Ref Range    Magnesium 2.2 1.8 - 2.6 mg/dL   Potassium   Result Value Ref Range    Potassium 4.5 3.5 - 5.0 mmol/L   Previous labs reviewed as above.    Assessment and Plan:     Bilateral anterior tibia pain, likely musculoskeletal in origin.  Most likely due to inflammation or irritation of muscles and ligaments in these areas.  I reviewed symptomatic treatment, including Aleve, ice, gentle stretching.  I offered her referral to physical therapy and she declined.  I offered her referral to orthopedics and she declined.  She will follow-up with PCP in 1-2 weeks if no better, sooner if worsening.    This dictation uses voice recognition software, which may contain typographical errors.

## 2021-06-20 NOTE — PROGRESS NOTES
"Hearing evaluation in conjunction with ENT exam (Dr. Mendez)    History:  Patient reported recent \"lightheadedness\", which occurs most times when standing from a seated position. This sensation also can occur occasionally when she is walking. The sensation lasts 5-10 seconds each time, and then resolves. She denied any changes in hearing, tinnitus, or aural fullness when she experiences this sensation, and additionally denied Tullio phenomenon when it was described to her. She is not currently being treated for any blood pressure issues. She has no history of significant noise exposure, and has had no recent illnesses. Per medical record, she has history of tachycardia and obstructive sleep apnea.    Results:  Otoscopy revealed non-occluding cerumen, bilaterally.  Hearing sensitivity was assessed with good reliability using insert phones.      Borderline normal hearing sensitivity for 250 Hz, sloping to moderate sensorineural hearing loss at 500-750 Hz, rising to borderline normal/mild sensorineural hearing loss at 1000 Hz, then rising to normal hearing sensitivity for 3800-2048 Hz, bilaterally. Sensorineural notch configuration was centered around 500 Hz, bilaterally.     Speech reception thresholds showed agreement with pure tone findings in each ear. Word recognition ability was excellent, bilaterally, with presentation levels above typical conversational volume in both ears.    Tympanometry was consistent with normal middle ear function, bilaterally.    Recommendations:  Follow-up with ENT; retest hearing per medical management or patient concern.  Wear hearing protection consistently in noise to preserve residual hearing sensitivity.  Shell Prince is a potential binaural amplification candidate, if patient motivation exists and medical clearance is granted.    Dariel Gomez, [facility]  [place] Licensed Audiologist [id number]          "

## 2021-06-20 NOTE — LETTER
Letter by Hugo Newton MD at      Author: Hugo Newton MD Service: -- Author Type: --    Filed:  Encounter Date: 8/3/2020 Status: (Other)         Shell Prince  [address] [place] [address]      August 3, 2020      Dear Shell,    As a valued [facility] patient, your healthcare needs are our priority.  Your health care team has determined that you are due for an appointment regarding your medication follow up.    To help prevent delays in your care, please call the [facility] at [phone].    We look forward to partnering with you to achieve optimal health and wellbeing.    Sincerely,  Your care team at [facility] and [facility]

## 2021-06-20 NOTE — PROGRESS NOTES
Subjective: Patient comes in for physical and has a number of medical issues she wants to discuss.    She has had previous bariatric surgery I did check CMP CBC lipid TSH vitamin D PTH and B12    Patient's had some symptoms of right arm numbness mainly hand possible carpal tunnel although negative Tinel's negative Phalen she will monitor that.    Her left palm has some weakness in through the thumb on the palmar side she says it on and off burns.  It is actually not week when I stressed that    We discussed getting an EMG if those issues persist    Patient's had some dizziness she says at times it seems motion related another times not taken last about 15-20 seconds.    Patient has a family history for atrial fibrillation she states that she feels like her heart races at times.    I can have her see cardiology regarding that and the dizziness.        Patient additionally has had some cough is been persistent.  She feels a throat irritation I did get a chest x-ray which was normal and I have her see ENT regarding the throat irritation.    Patient's had elevated liver tests mild AST and ALT on and off she had fatty liver back in 2006 prior to the bariatric surgery please see below        Tobacco status: She  reports that she has never smoked. She has never used smokeless tobacco.    Patient Active Problem List    Diagnosis Date Noted     Fatty liver 09/10/2018     Bariatric surgery status 09/10/2018     Abnormal Pap smear 05/13/2015     Insomnia 04/30/2015     Tricuspid Regurgitation      Tachycardia      Secondary Hyperparathyroidism      Primary Hyperparathyroidism Due To Adenoma        Current Outpatient Prescriptions   Medication Sig Dispense Refill     amitriptyline (ELAVIL) 50 MG tablet TAKE TWO TABLETS BY MOUTH EVERY DAY AT BEDTIME 180 tablet 1     cyclobenzaprine (FLEXERIL) 10 MG tablet Take 1 tablet (10 mg total) by mouth 2 (two) times a day as needed for muscle spasms. 60 tablet 1     omeprazole (PRILOSEC)  "40 MG capsule TAKE 1 CAPSULE BY MOUTH(40 MG TOTAL) DAILY 90 capsule 1     calcium carbonate/vitamin D3 (CALCIUM+D ORAL) Take 1 tablet by mouth daily.       iron,carbonyl/ascorbic acid (VITRON-C ORAL) Take 1 tablet by mouth daily.       multivit-minerals/ferrous fum (MULTI VITAMIN ORAL) Take 1 tablet by mouth daily.       No current facility-administered medications for this visit.        ROS: 10 point review of systems positive as outlined regarding multiple issues above otherwise negative    Objective:    /62 (Patient Site: Right Arm, Patient Position: Sitting, Cuff Size: Adult Regular)  Pulse 80  Ht 5' 6\" (1.676 m)  Wt 181 lb (82.1 kg)  LMP 01/11/2015  BMI 29.21 kg/m2  Body mass index is 29.21 kg/(m^2).      General appearance no acute distress    Vital signs were stable as outlined    Neck without adenopathy oropharynx had no abnormalities canals and TMs normal    Mild reproduction of some symptoms with Harris a maneuvers.    No nystagmus    Skin is normal    Lungs are clear throughout no rales or rhonchi heart sounded regular here rate at 80.    Breasts without mass no axillary or inguinal adenopathy    Pelvic was done Pap smear obtained she had previous ASCUS Pap smear about 3 years ago    Colonoscopy ordered    Extremities without edema pulses normal    Musculoskeletal equivocal Phalen's on the right    No muscle wasting on the left.    No pain in through the neck.    Encouraged patient to try a wrist splint    Chest x-ray appeared clear of infiltrate    Labs with again AST 48 ALT 67 she has had that in the past although more recently had come down some    Normal CBC    Lipids look good    TSH normal vitamin D normal PTH normal B12 over 2000    Pap smear normal    Results for orders placed or performed in visit on 09/06/18   Comprehensive Metabolic Panel   Result Value Ref Range    Sodium 143 136 - 145 mmol/L    Potassium 4.2 3.5 - 5.0 mmol/L    Chloride 106 98 - 107 mmol/L    CO2 29 22 - 31 mmol/L "    Anion Gap, Calculation 8 5 - 18 mmol/L    Glucose 88 70 - 125 mg/dL    BUN 16 8 - 22 mg/dL    Creatinine 0.81 0.60 - 1.10 mg/dL    GFR MDRD Af Amer >60 >60 mL/min/1.73m2    GFR MDRD Non Af Amer >60 >60 mL/min/1.73m2    Bilirubin, Total 0.2 0.0 - 1.0 mg/dL    Calcium 9.1 8.5 - 10.5 mg/dL    Protein, Total 6.8 6.0 - 8.0 g/dL    Albumin 3.8 3.5 - 5.0 g/dL    Alkaline Phosphatase 72 45 - 120 U/L    AST 48 (H) 0 - 40 U/L    ALT 67 (H) 0 - 45 U/L   Lipid Cascade RANDOM   Result Value Ref Range    Cholesterol 203 (H) <=199 mg/dL    Triglycerides 96 <=149 mg/dL    HDL Cholesterol 75 >=50 mg/dL    LDL Calculated 109 <=129 mg/dL    Patient Fasting > 8hrs? No    Thyroid Stimulating Hormone (TSH)   Result Value Ref Range    TSH 0.76 0.30 - 5.00 uIU/mL   HM2(CBC w/o Differential)   Result Value Ref Range    WBC 6.7 4.0 - 11.0 thou/uL    RBC 4.41 3.80 - 5.40 mill/uL    Hemoglobin 13.0 12.0 - 16.0 g/dL    Hematocrit 39.9 35.0 - 47.0 %    MCV 90 80 - 100 fL    MCH 29.5 27.0 - 34.0 pg    MCHC 32.6 32.0 - 36.0 g/dL    RDW 12.6 11.0 - 14.5 %    Platelets 186 140 - 440 thou/uL    MPV 8.2 7.0 - 10.0 fL   Vitamin D, Total (25-Hydroxy)   Result Value Ref Range    Vitamin D, Total (25-Hydroxy) 47.4 30.0 - 80.0 ng/mL   Parathyroid Hormone Intact   Result Value Ref Range    PTH 60 10 - 86 pg/mL   Vitamin B12   Result Value Ref Range    Vitamin B-12 >2000 (H) 213 - 816 pg/mL   Gynecologic Cytology (PAP Smear)   Result Value Ref Range    Case Report       Gynecologic Cytology Report                       Case: [id number]                                   Authorizing Provider:  Caryn Cano MD         Collected:           09/06/2018 1640              Ordering Location:     [facility]  Received:            09/06/2018 1640                                     Medicine/OB                                                                  First Screen:          Alysia Burnett, CT                                                                             (ASCP)                                                                       Rescreen:              Doretha Shields, CT                                                                                (ASCP)                                                                       Specimen:    SUREPATH PAP, SCREENING, Endocervical/cervical                                             Interpretation       Negative for squamous intraepithelial lesion or malignancy    Result Flag Normal Normal    Specimen Adequacy       Satisfactory for evaluation, endocervical/transformation zone component present    HPV Reflex? Yes if Abnormal     HIGH RISK No     LMP/Menopause Date 5 yr ago     Abnormal Bleeding No     Pt Status NA     Birth Control/Hormones None     Previous Normal/Date ?     Prev Abn Date/Dx 2015 ASCUS     Cervical Appearance normal        Assessment:  1. Routine general medical examination at a health care facility  Comprehensive Metabolic Panel    Lipid Cascade RANDOM    Thyroid Stimulating Hormone (TSH)    HM2(CBC w/o Differential)    Gynecologic Cytology (PAP Smear)   2. Visit for screening mammogram  Mammo Screening Bilateral   3. Screen for colon cancer  Ambulatory referral for Colonoscopy   4. Primary Hyperparathyroidism Due To Adenoma  Comprehensive Metabolic Panel   5. Thumb pain, left     6. Dizziness     7. Bariatric surgery status  Comprehensive Metabolic Panel    Lipid Cascade RANDOM    Thyroid Stimulating Hormone (TSH)    HM2(CBC w/o Differential)    Vitamin D, Total (25-Hydroxy)    Parathyroid Hormone Intact    Vitamin B12   8. Tachycardia  Ambulatory referral to Cardiology   9. Vertigo  Ambulatory referral to ENT   10. Throat irritation  Ambulatory referral to ENT   11. Cough  XR Chest 2 Views   12. Fatty liver      Ultrasound 2006.  AST and ALT mildly elevated     Patient be contacted regarding results    See ENT regarding her throat irritation/cough and also the  vertigo.    See cardiology regarding her tachycardia and dizziness and family history of A. fib question monitor    Bariatric status stable    Thumb burning pain through the left palm    Right arm tingling consider EMG.    Screening mammogram    Screening colonoscopy    Status post bariatric surgery    Plan: As outlined above, will need to follow liver function consider ultrasound again    This transcription uses voice recognition software, which may contain typographical errors.

## 2021-06-20 NOTE — PROGRESS NOTES
Shell Prince is a 52 y.o. female seen in consultation at the request of Dr. Newton for dizziness.  Onset: years but worse in the last year  Episodic vs Chronic: episodic  Length of episodes: 4-5 seconds.    Description of episodes: Imbalance, spinning inside your head, lightheadedness  Last episode: Today  Frequency of episodes: when she stands up quickly.    Positional: changing position.    Change in hearing with episodes:  no  Otologic history of infections or surgery: no  History of headaches: no  Headaches with episodes: no  History of head trauma: no  Previous evaluations: no  Previous medications: no       ALLERGY:    Allergies   Allergen Reactions     Tetracyclines        MEDICATIONS:     Current Outpatient Prescriptions on File Prior to Visit   Medication Sig Dispense Refill     amitriptyline (ELAVIL) 50 MG tablet TAKE TWO TABLETS BY MOUTH EVERY DAY AT BEDTIME 180 tablet 1     calcium carbonate/vitamin D3 (CALCIUM+D ORAL) Take 1 tablet by mouth daily.       cyclobenzaprine (FLEXERIL) 10 MG tablet Take 1 tablet (10 mg total) by mouth 2 (two) times a day as needed for muscle spasms. 60 tablet 1     iron,carbonyl/ascorbic acid (VITRON-C ORAL) Take 1 tablet by mouth daily.       multivit-minerals/ferrous fum (MULTI VITAMIN ORAL) Take 1 tablet by mouth daily.       omeprazole (PRILOSEC) 40 MG capsule TAKE 1 CAPSULE BY MOUTH(40 MG TOTAL) DAILY 90 capsule 1     No current facility-administered medications on file prior to visit.        Past Medical/Surgical History, Family History and Social History reviewed in detail and documented separately in the medical record.    Complete Review of Systems:  A 10-point review was performed.  Pertinent positives are noted in the HPI and on a separate scanned document in the chart.    EXAM:  There were no vitals filed for this visit.    Nurse documentation reviewed  and documented separately.    General Appearance: Pleasant, alert, appropriate appearance for age. No acute  distress    Head Exam: Normal. Normocephalic, atraumatic.    Eye Exam: Normal external eye, conjunctiva, lids, cornea. Extra-ocular movements are intact.    Left external ear: normal  Left otoscopic exam: Normal EAC. Normal TM     Right external ear: normal  Right otoscopic exam: Normal EAC. Normal TM    Nose Exam: Normal external nose. Septum midline. Nasal mucosa normal.  Inferior turbinates normal.    OroPharynx Exam: Dental hygiene adequate. Normal tongue. Normal buccal mucosa. Normal palate.  Normal pharynx. Normal tonsils.    Neck Exam: Supple, no masses or nodes. Trachea and larynx midline.    Thyroid Exam: No tenderness, nodules or enlargement.    Salivary Glands: nontender without masses    Neuro: Alert and oriented times 3, CN 2-12 grossly intact, no nystagmus, PERRL, EOMI, normal speech and gait    Chest/Respiratory Exam: Normal chest wall motion and respiratory effort. No audible stridor or wheezing.    Cardiovascular Exam: Regular rate and rhythm.  No cyanosis, clubbing or edema.    Pulses: carotid pulses normal    Vestibular:  Gait is normal, tandem gait is normal, Romberg is normal, Taylor-Hallpike is normal, Finger-nose-finger is normal, Fukuda is normal    ASSESSMENT:  1. Dizziness        PLAN: Findings, assessment, and management options were discussed. Etiology unclear.  Certainly with the sitting to standing, orthostatic hypotension could be considered.  Will get a VNG due to the spinning sensation.  Will follow up with results.

## 2021-06-20 NOTE — LETTER
Letter by Ofelia Madden MD at      Author: Ofelia Madden MD Service: -- Author Type: --    Filed:  Encounter Date: 7/20/2020 Status: (Other)         Shell CECI Prince  [address] [place] [address]      July 20, 2020      Dear Shell,    As a valued [facility] patient, your healthcare needs are our priority.  Your health care team has determined that you are due for an appointment regarding your medication check. We offer telephone and or video appointments as well.    To help prevent delays in your care, please call the [facility] at [phone].    We look forward to partnering with you to achieve optimal health and wellbeing.    Sincerely,  Your care team at [facility] and [facility]

## 2021-06-21 NOTE — PROGRESS NOTES
HPI: This patient is a 53yo F who presents for evaluation of the throat at the request of Dr. Newton. She has been seen before by Dr. Mendez for dizziness. Today, she presents for eval of a chronic globus sensation for about 5yrs. There is occasional hoarseness and occasional dry cough. Denies fevers, otalgia, weight loss, odynophagia, dysphagia, hemoptysis, and shortness of breath. Does not complain of sour taste, heartburn, or burping. Has been taking reflux medication for 10yrs.    Past medical history, surgical history, social history, family history, medications, and allergies have been reviewed with the patient and are documented above.    Review of Systems: a 10-system review was performed. Pertinent positives are noted in the HPI and on a separate scanned document in the chart.    PHYSICAL EXAMINATION:  GEN: no acute distress, normocephalic  EYES: extraocular movements are intact, pupils are equal and round. Sclera clear.   EARS: auricles are normally formed. The external auditory canals are clear with minimal to no cerumen. Tympanic membranes are intact bilaterally with no signs of infection, effusion, retractions, or perforations.  NOSE: anterior nares are patent. There are no masses or lesions. The septum is non-obstructing.  OC/OP: clear, dentition is in good repair. The tongue and palate are fully mobile and symmetric. No masses or lesions. Cobblestoning of the posterior pharyngeal wall. Slight prominence of the lymph tissue along the right posterolateral oropharynx wall that feels soft and appears non-concerning. S/p tonsillectomy  HP/L (scope): nasopharynx, base of tongue, vallecula, epiglottis, and pyriform sinuses are clear. The bilateral vocal folds are mobile and without lesion. There is interarytenoid edema and erythema.  NECK: soft and supple. No lymphadenopathy or masses. Airway is midline.  NEURO: CN VII and XII symmetric. alert and oriented. No spontaneous nystagmus. Gait is normal.  PULM:  breathing comfortably on room air, normal chest expansion with respiration  CARDS: no cyanosis or clubbing, normal carotid pulses    MEDICAL DECISION-MAKING: This patient is a  53yo F with symptoms most consistent with chronic laryngitis/globus sensation from acid reflux. Discussed diet and lifestyle changes in addition to suggesting a GI referral. She would like this.

## 2021-06-23 NOTE — PROGRESS NOTES
"Subjective: Patient comes in for follow-up from the emergency room she was seen on 1/23/2019.  She had 3-4 hours of periumbilical pain at times a little generalized    She had similar pain is quite intense when it occurs about 1-2 months ago    Patient has a history of gastric bypass also status post cholecystectomy    She has seen [facility] for globus symptoms.  She had a EGD done and a colonoscopy done on 1/9/2019.  Patient had a serrated adenoma needs a follow-up colonoscopy in 3 years.  The EGD was normal felt to have \"GERD without esophagitis \".    Patient also has a history of fatty liver her AST and ALT of been in the 40-70 range.    Patient in the hospital had  ALT 86 lactic acid was normal CBC normal lipase normal.    She went on for CT scan of the abdomen and pelvis and showed evidence of acute enteritis with some edema of the mesentery and small bowel wall.    She was not treated with antibiotics she is feeling better    She did get a prescription for Zofran and Bentyl.  She has not filled these        Tobacco status: She  reports that  has never smoked. she has never used smokeless tobacco.    Patient Active Problem List    Diagnosis Date Noted     Adenomatous colon polyp 01/16/2019     Fatty liver 09/10/2018     Bariatric surgery status 09/10/2018     Abnormal Pap smear 05/13/2015     Insomnia 04/30/2015     Tricuspid Regurgitation      Tachycardia      Secondary Hyperparathyroidism      Primary Hyperparathyroidism Due To Adenoma        Current Outpatient Medications   Medication Sig Dispense Refill     amitriptyline (ELAVIL) 50 MG tablet TAKE TWO TABLETS BY MOUTH EVERY DAY AT BEDTIME. 180 tablet 3     calcium carbonate/vitamin D3 (CALCIUM+D ORAL) Take 1 tablet by mouth daily.       iron,carbonyl/ascorbic acid (VITRON-C ORAL) Take 1 tablet by mouth daily.       multivit-minerals/ferrous fum (MULTI VITAMIN ORAL) Take 1 tablet by mouth daily.       omeprazole (PRILOSEC) 40 MG capsule TAKE 1 " CAPSULE BY MOUTH DAILY 90 capsule 2     cyclobenzaprine (FLEXERIL) 10 MG tablet Take 1 tablet (10 mg total) by mouth 2 (two) times a day as needed for muscle spasms. 60 tablet 1     dicyclomine (BENTYL) 20 mg tablet Take 1 tablet (20 mg total) by mouth every 6 (six) hours as needed (abdominal pain). 20 tablet 0     ondansetron (ZOFRAN) 4 MG tablet Take 1 tablet (4 mg total) by mouth every 6 (six) hours as needed for nausea. 20 tablet 0     No current facility-administered medications for this visit.        ROS: 10 point review of systems positive as discussed above otherwise negative    Objective:    /76 (Patient Site: Right Arm, Patient Position: Sitting, Cuff Size: Adult Regular)   Pulse 80   Resp 12   Wt 194 lb (88 kg)   LMP 01/11/2015   BMI 31.31 kg/m    Body mass index is 31.31 kg/m .      General appearance no acute distress    Lungs are clear no rales or rhonchi heart regular S1-S2    Her abdomen was soft nontender no guarding or rebound.    CT scan reviewed    Labs reviewed    Previous colonoscopy and EGD reviewed.    Consultation from [facility] also reviewed    Results for orders placed or performed during the hospital encounter of 01/23/19   Lipase   Result Value Ref Range    Lipase 52 0 - 52 U/L   Lactic Acid   Result Value Ref Range    Lactic Acid 1.0 0.5 - 2.2 mmol/L   Hepatic Profile   Result Value Ref Range    Bilirubin, Total 0.3 0.0 - 1.0 mg/dL    Bilirubin, Direct <0.1 <=0.5 mg/dL    Protein, Total 6.9 6.0 - 8.0 g/dL    Albumin 3.7 3.5 - 5.0 g/dL    Alkaline Phosphatase 63 45 - 120 U/L     (H) 0 - 40 U/L    ALT 96 (H) 0 - 45 U/L   Basic Metabolic Panel   Result Value Ref Range    Sodium 138 136 - 145 mmol/L    Potassium 4.5 3.5 - 5.0 mmol/L    Chloride 103 98 - 107 mmol/L    CO2 25 22 - 31 mmol/L    Anion Gap, Calculation 10 5 - 18 mmol/L    Glucose 127 (H) 70 - 125 mg/dL    Calcium 9.2 8.5 - 10.5 mg/dL    BUN 22 8 - 22 mg/dL    Creatinine 0.91 0.60 - 1.10 mg/dL    GFR MDRD Af  Amer >60 >60 mL/min/1.73m2    GFR MDRD Non Af Amer >60 >60 mL/min/1.73m2   HCG, Qual   Result Value Ref Range    Beta hCG Qualitative Negative Negative   HM1 (CBC with Diff)   Result Value Ref Range    WBC 8.9 4.0 - 11.0 thou/uL    RBC 4.15 3.80 - 5.40 mill/uL    Hemoglobin 12.6 12.0 - 16.0 g/dL    Hematocrit 38.8 35.0 - 47.0 %    MCV 94 80 - 100 fL    MCH 30.4 27.0 - 34.0 pg    MCHC 32.5 32.0 - 36.0 g/dL    RDW 12.6 11.0 - 14.5 %    Platelets 184 140 - 440 thou/uL    MPV 10.4 8.5 - 12.5 fL    Neutrophils % 82 (H) 50 - 70 %    Lymphocytes % 12 (L) 20 - 40 %    Monocytes % 5 2 - 10 %    Eosinophils % 1 0 - 6 %    Basophils % 0 0 - 2 %    Neutrophils Absolute 7.3 2.0 - 7.7 thou/uL    Lymphocytes Absolute 1.1 0.8 - 4.4 thou/uL    Monocytes Absolute 0.4 0.0 - 0.9 thou/uL    Eosinophils Absolute 0.1 0.0 - 0.4 thou/uL    Basophils Absolute 0.0 0.0 - 0.2 thou/uL   Alcohol, Ethyl, Blood   Result Value Ref Range    Alcohol, Blood <10 None detected mg/dL   Acetaminophen(Tylenol )   Result Value Ref Range    Acetaminophen <3.0 (L) 10.0 - 20.0 ug/mL       Assessment:  1. Enteritis  Ambulatory referral to Gastroenterology   2. Adenomatous polyp of colon, unspecified part of colon     3. Fatty liver     4. Bariatric surgery status     5. Elevated LFTs  Ambulatory referral to Gastroenterology   6. Muscle spasm  cyclobenzaprine (FLEXERIL) 10 MG tablet     Enteritis question etiology she has had 2 episodes of this.  Also now has elevated liver test higher than previous.  She does have underlying fatty liver and previous gastric bypass.    Also status post cholecystectomy.    I will have her see [facility] regarding the enteritis and I will colon polyp recheck in 3 years    GERD continue omeprazole    I encouraged her to fill the Bentyl and Zofran so that if she does get symptoms starting she could start on the Bentyl and see if that helps.    Will await the consultation from [facility]    Plan: As discussed above.  Total time  with patient 25 minutes over 20 minutes spent in counseling reviewing symptoms workup and discussing etiology and coordinating care    This transcription uses voice recognition software, which may contain typographical errors.

## 2021-06-26 NOTE — PROGRESS NOTES
Progress Notes by Vinicio Rosas MD at 9/7/2018  9:50 AM     Author: Vinicio Rosas MD Service: -- Author Type: Physician    Filed: 9/13/2018  8:01 AM Encounter Date: 9/7/2018 Status: Signed    : Vinicio Rosas MD (Physician)           Click to link to [facility] HEART CARE NOTE    Thank you, Dr. Hugo Newton MD, for asking the [facility] Heart Care team to see Ms. Shell Prince to evaluate Tachycardia.      Assessment/Recommendations   Assessment:    1. Palpitations -rhythm is uncertain but seems to be low risk and infrequent.  Further evaluation with ambulatory cardiac rhythm monitoring is unlikely to yield results due to infrequent nature of symptoms  2. Cardiac prevention -we discussed healthy lifestyle by maintaining a healthy weight and exercising regularly for general heart health.  Blood pressure is well controlled.  Lipids are also recently controlled in a patient without coronary disease or diabetes.  3. Sleep apnea -the patient reports compliance with her CPAP therapy of which I encouraged continued use.    Plan:  1. I recommended at least 30 minutes of moderate intensity exercise 5 days a week  2. Continue maintenance of a healthy weight  3. Continue CPAP use  4. No specific cardiac evaluation is necessary at this time  5. I will see the patient back on an as-needed basis if additional questions or concerns arise.           History of Present Illness   Ms. Shell Prince is a 52 y.o. female with a significant past history of sleep apnea, obesity s/p gastric bypass surgery, and GERD who presents for evaluation of palpitations and general heart concerns.     Ms. Prince states that she feels palpitations on an intermittent basis.  They occur about once every 3 months or less.  She tends to notice some when she is lying down and trying to go to sleep.  Described as a rapid heartbeat.  Not associated with any symptoms of lightheadedness or  presyncope.  Has not find precipitating, aggravating or alleviating factors.  The symptoms are self-limited and gone by morning.    She has a family history of coronary disease in both her grandparents, her mother had a hole in her heart that was resident at birth, and her father has atrial fibrillation.  She is concerned about her family history of heart disease and what she can do to prevent developing herself.  After a gastric bypass surgery she had lost a substantial amount of weight and has been maintaining her weight around 180 pounds with diet control.  The patient does not exercise on a regular basis and feels out of shape.  Other than exertional deconditioning she denies any symptoms of chest pain on exertion or shortness of breath.      Other than noted above, Ms. Prince denies any chest pain/pressure/tightness, shortness of breath at rest or with exertion, light headedness/dizziness, pre-syncope, syncope, lower extremity swelling, palpitations, paroxysmal nocturnal dyspnea (PND), or orthopnea.     Cardiac Problems and Cardiac Diagnostics     Most Recent Cardiac testing:  ECG dated 9/7/18 (personaly reviewed and interpreted): normal sinus rhythm. Normal ECG    ECHO (report reviewed): Echo from 2011 showed hyperdynamic LV function, normal RV function and no significant valve disease.      Stress test: The patient has not had a previous stress test or a coronary angiogram.         Medications  Allergies   Current Outpatient Prescriptions   Medication Sig Dispense Refill   ? amitriptyline (ELAVIL) 50 MG tablet TAKE TWO TABLETS BY MOUTH EVERY DAY AT BEDTIME 180 tablet 1   ? calcium carbonate/vitamin D3 (CALCIUM+D ORAL) Take 1 tablet by mouth daily.     ? cyclobenzaprine (FLEXERIL) 10 MG tablet Take 1 tablet (10 mg total) by mouth 2 (two) times a day as needed for muscle spasms. 60 tablet 1   ? iron,carbonyl/ascorbic acid (VITRON-C ORAL) Take 1 tablet by mouth daily.     ? multivit-minerals/ferrous fum (MULTI  VITAMIN ORAL) Take 1 tablet by mouth daily.     ? omeprazole (PRILOSEC) 40 MG capsule TAKE 1 CAPSULE BY MOUTH(40 MG TOTAL) DAILY 90 capsule 1     No current facility-administered medications for this visit.       Allergies   Allergen Reactions   ? Tetracyclines         Physical Examination Review of Systems   Vitals:    09/07/18 0939   BP: 100/66   Pulse: 84   Resp: 16     Body mass index is 29.38 kg/(m^2).  Wt Readings from Last 3 Encounters:   09/07/18 182 lb (82.6 kg)   09/06/18 181 lb (82.1 kg)   12/18/17 184 lb (83.5 kg)       General Appearance:   Pleasant female, appears stated age. no acute distress, mildly overweight body habitus   ENT/Mouth: membranes moist, no apparent gingival bleeding.      EYES:  no scleral icterus, normal conjunctivae   Neck: no carotid bruits. No anterior cervical lymphadenopaty   Respiratory:   lungs are clear to auscultation, no rales or wheezing, equal chest wall expansion    Cardiovascular:   Regular rhythm, normal rate. Normal first and second heart sounds with no murmurs, rubs, or gallops; the carotid, radial and posterior tibial pulses are intact, Jugular venous pressure normal, no edema bilaterally    Abdomen/GI:   Soft and nontender   Extremities: no cyanosis or clubbing   Skin: no xanthelasma, warm.    Heme/lymph/ Immunology No apparent bleeding noted.   Neurologic: Alert and oriented. normal gait, no tremors     Psychiatric: Pleasant, calm, appropriate affect.    A complete 10 system review of systems was performed and is negative except as mentioned in the HPI or below:  General: WNL  Eyes: WNL  Ears/Nose/Throat: WNL  Lungs: WNL  Heart: Irregular Heartbeat  Stomach: WNL  Bladder: WNL  Muscle/Joints: Muscle Pain  Skin: WNL  Nervous System: Dizziness  Mental Health: WNL     Blood: WNL       Past History   Past Medical History:   Obesity  Sleep apnea  Patient Active Problem List    Diagnosis Date Noted   ? Abnormal Pap smear 05/13/2015   ? Insomnia 04/30/2015   ? Tricuspid  Regurgitation    ? Tachycardia    ? Secondary Hyperparathyroidism    ? Primary Hyperparathyroidism Due To Adenoma        Past Surgical History:   Past Surgical History:   Procedure Laterality Date   ? [place] GASTRIC BYPASS,OBESE<150CM KRIS-EN-Y      Description: Gastric Surgery For Morbid Obesity Bypass With Kris-en-Y;  Recorded: 2008;       Family History:   Family History   Problem Relation Age of Onset   ? Sleep apnea Sister    ? Sleep apnea Sister    Father with atrial fibrillation  Mother with a hole in her heart at birth  Both grandfathers with coronary disease and  of heart attacks    Social History:   Social History     Social History   ? Marital status: Single     Spouse name: N/A   ? Number of children: N/A   ? Years of education: N/A     Occupational History   ? Not on file.     Social History Main Topics   ? Smoking status: Never Smoker   ? Smokeless tobacco: Never Used   ? Alcohol use No   ? Drug use: Not on file   ? Sexual activity: Not on file     Other Topics Concern   ? Not on file     Social History Narrative              Lab Results    Chemistry/lipid CBC Cardiac Enzymes/BNP/TSH/INR   Lab Results   Component Value Date    CHOL 203 (H) 2018    HDL 75 2018    LDLCALC 109 2018    TRIG 96 2018    CREATININE 0.81 2018    BUN 16 2018    K 4.2 2018     2018     2018    CO2 29 2018    Lab Results   Component Value Date    WBC 6.7 2018    HGB 13.0 2018    HCT 39.9 2018    MCV 90 2018     2018    Lab Results   Component Value Date    CKTOTAL 144 2015    TSH 0.76 2018          Vinicio Rsoas MD  Non-invasive Cardiology  [facility]

## 2022-01-01 NOTE — PROVIDER NOTIFICATION
MD Notification    Notified Person: MD    Notified Person Name:  Colorectal Surgery: Dr. Thomas    Notification Date/Time: 11/19/2020 at 1338    Notification Interaction: pager    Purpose of Notification: Pt unable to have good pain control with Oxy 5 mg and Tylenol alone. Fever of 99.6 , pt feels flushed.  Okay to discharge given the symptoms?     Orders Received: oxy 5-10 mg orescribed    Comments:  
MD Notification    Notified Person: MD    Notified Person Name: Dr. Rehman: Colorectal Surgery    Notification Date/Time: 11/19/2020 at 1726    Notification Interaction: phone with     Purpose of Notification: Pt experiencing watery/loose diarrhea x 3, + abd discomfort. On IV Abx.  Should we rule out for C-diff?      Orders Received: order for c-diff placed per MD.     Comments:  
No

## 2022-02-09 ENCOUNTER — LAB REQUISITION (OUTPATIENT)
Dept: LAB | Facility: CLINIC | Age: 57
End: 2022-02-09

## 2022-02-09 DIAGNOSIS — M79.604 PAIN IN RIGHT LEG: ICD-10-CM

## 2022-02-09 DIAGNOSIS — Z01.419 ENCOUNTER FOR GYNECOLOGICAL EXAMINATION (GENERAL) (ROUTINE) WITHOUT ABNORMAL FINDINGS: ICD-10-CM

## 2022-02-09 LAB
ALBUMIN SERPL-MCNC: 4.2 G/DL (ref 3.5–5)
ALP SERPL-CCNC: 85 U/L (ref 45–120)
ALT SERPL W P-5'-P-CCNC: 50 U/L (ref 0–45)
ANION GAP SERPL CALCULATED.3IONS-SCNC: 12 MMOL/L (ref 5–18)
AST SERPL W P-5'-P-CCNC: 34 U/L (ref 0–40)
BILIRUB SERPL-MCNC: 0.4 MG/DL (ref 0–1)
BUN SERPL-MCNC: 14 MG/DL (ref 8–22)
CALCIUM SERPL-MCNC: 9.7 MG/DL (ref 8.5–10.5)
CHLORIDE BLD-SCNC: 104 MMOL/L (ref 98–107)
CO2 SERPL-SCNC: 26 MMOL/L (ref 22–31)
CREAT SERPL-MCNC: 0.85 MG/DL (ref 0.6–1.1)
GFR SERPL CREATININE-BSD FRML MDRD: 80 ML/MIN/1.73M2
GLUCOSE BLD-MCNC: 96 MG/DL (ref 70–125)
POTASSIUM BLD-SCNC: 4.2 MMOL/L (ref 3.5–5)
PROT SERPL-MCNC: 7.4 G/DL (ref 6–8)
SODIUM SERPL-SCNC: 142 MMOL/L (ref 136–145)

## 2022-02-09 PROCEDURE — 80053 COMPREHEN METABOLIC PANEL: CPT | Performed by: PHYSICIAN ASSISTANT

## 2022-02-09 PROCEDURE — G0123 SCREEN CERV/VAG THIN LAYER: HCPCS | Performed by: PHYSICIAN ASSISTANT

## 2022-02-09 PROCEDURE — 87624 HPV HI-RISK TYP POOLED RSLT: CPT | Performed by: PHYSICIAN ASSISTANT

## 2022-02-14 LAB
HUMAN PAPILLOMA VIRUS 16 DNA: NEGATIVE
HUMAN PAPILLOMA VIRUS 18 DNA: NEGATIVE
HUMAN PAPILLOMA VIRUS FINAL DIAGNOSIS: NORMAL
HUMAN PAPILLOMA VIRUS OTHER HR: NEGATIVE

## 2022-02-16 LAB
BKR LAB AP GYN ADEQUACY: NORMAL
BKR LAB AP GYN INTERPRETATION: NORMAL
BKR LAB AP HPV REFLEX: NORMAL
BKR LAB AP LMP: NORMAL
BKR LAB AP PREVIOUS ABNORMAL: NORMAL
PATH REPORT.COMMENTS IMP SPEC: NORMAL
PATH REPORT.COMMENTS IMP SPEC: NORMAL
PATH REPORT.RELEVANT HX SPEC: NORMAL

## 2022-12-10 ENCOUNTER — HOSPITAL ENCOUNTER (EMERGENCY)
Facility: CLINIC | Age: 57
Discharge: HOME | End: 2022-12-10
Attending: EMERGENCY MEDICINE | Admitting: EMERGENCY MEDICINE
Payer: COMMERCIAL

## 2022-12-10 VITALS
RESPIRATION RATE: 26 BRPM | WEIGHT: 185 LBS | HEART RATE: 79 BPM | HEIGHT: 66 IN | TEMPERATURE: 97.4 F | OXYGEN SATURATION: 100 % | DIASTOLIC BLOOD PRESSURE: 86 MMHG | SYSTOLIC BLOOD PRESSURE: 120 MMHG | BODY MASS INDEX: 29.73 KG/M2

## 2022-12-10 DIAGNOSIS — R42 VERTIGO: ICD-10-CM

## 2022-12-10 DIAGNOSIS — H61.22 CERUMINOSIS, LEFT: ICD-10-CM

## 2022-12-10 PROCEDURE — 99284 EMERGENCY DEPT VISIT MOD MDM: CPT | Mod: 25

## 2022-12-10 PROCEDURE — 250N000013 HC RX MED GY IP 250 OP 250 PS 637: Performed by: EMERGENCY MEDICINE

## 2022-12-10 PROCEDURE — 250N000011 HC RX IP 250 OP 636: Performed by: EMERGENCY MEDICINE

## 2022-12-10 PROCEDURE — 258N000003 HC RX IP 258 OP 636: Performed by: EMERGENCY MEDICINE

## 2022-12-10 PROCEDURE — 93005 ELECTROCARDIOGRAM TRACING: CPT | Performed by: EMERGENCY MEDICINE

## 2022-12-10 PROCEDURE — 96361 HYDRATE IV INFUSION ADD-ON: CPT

## 2022-12-10 PROCEDURE — 96374 THER/PROPH/DIAG INJ IV PUSH: CPT

## 2022-12-10 PROCEDURE — 96375 TX/PRO/DX INJ NEW DRUG ADDON: CPT

## 2022-12-10 RX ORDER — MECLIZINE HYDROCHLORIDE 25 MG/1
25 TABLET ORAL ONCE
Status: COMPLETED | OUTPATIENT
Start: 2022-12-10 | End: 2022-12-10

## 2022-12-10 RX ORDER — DIPHENHYDRAMINE HYDROCHLORIDE 50 MG/ML
25 INJECTION INTRAMUSCULAR; INTRAVENOUS ONCE
Status: COMPLETED | OUTPATIENT
Start: 2022-12-10 | End: 2022-12-10

## 2022-12-10 RX ORDER — MECLIZINE HYDROCHLORIDE 25 MG/1
25 TABLET ORAL 3 TIMES DAILY PRN
Qty: 21 TABLET | Refills: 0 | Status: SHIPPED | OUTPATIENT
Start: 2022-12-10 | End: 2022-12-17

## 2022-12-10 RX ORDER — DROPERIDOL 2.5 MG/ML
0.62 INJECTION, SOLUTION INTRAMUSCULAR; INTRAVENOUS ONCE
Status: COMPLETED | OUTPATIENT
Start: 2022-12-10 | End: 2022-12-10

## 2022-12-10 RX ORDER — PROCHLORPERAZINE MALEATE 5 MG
TABLET ORAL
Qty: 12 TABLET | Refills: 0 | Status: SHIPPED | OUTPATIENT
Start: 2022-12-10

## 2022-12-10 RX ADMIN — DIPHENHYDRAMINE HYDROCHLORIDE 25 MG: 50 INJECTION INTRAMUSCULAR; INTRAVENOUS at 09:57

## 2022-12-10 RX ADMIN — MECLIZINE HYDROCHLORIDE 25 MG: 25 TABLET ORAL at 10:14

## 2022-12-10 RX ADMIN — SODIUM CHLORIDE 1000 ML: 9 INJECTION, SOLUTION INTRAVENOUS at 09:57

## 2022-12-10 RX ADMIN — DROPERIDOL 0.62 MG: 2.5 INJECTION, SOLUTION INTRAMUSCULAR; INTRAVENOUS at 09:57

## 2022-12-10 ASSESSMENT — ENCOUNTER SYMPTOMS
DIZZINESS: 1
NAUSEA: 1
LIGHT-HEADEDNESS: 1
VOMITING: 0
WEAKNESS: 0
COUGH: 0

## 2022-12-10 ASSESSMENT — ACTIVITIES OF DAILY LIVING (ADL): ADLS_ACUITY_SCORE: 35

## 2022-12-10 NOTE — Clinical Note
Shell Prince was seen and treated in our emergency department on 12/10/2022.  She may return to work on 12/13/2022.       If you have any questions or concerns, please don't hesitate to call.      Sean Dacosta MD

## 2022-12-10 NOTE — ED PROVIDER NOTES
Emergency Department Encounter      NAME: Shell Prince  AGE: 57 year old female  YOB: 1965  MRN: [id number]  EVALUATION DATE & TIME: 12/10/2022  9:05 AM    PCP: Ofelia Madden    ED PROVIDER: Sean Dacosta M.D.      Chief Complaint   Patient presents with     Dizziness         FINAL IMPRESSION:  1. Vertigo    2. Ceruminosis, left        MEDICAL DECISION MAKIN:16 AM I met with the patient, obtained history, performed an initial exam, and discussed options and plan for diagnostics and treatment here in the ED. PPE: procedure mask  10:22 AM I reassessed patient and completed ear exam    Patient is a 57-year-old female with a history of hyperparathyroidism who presents with dizziness.  She says that she has a vertigo type sensation and feels lightheaded.  She says that that the symptoms began this morning while she was listening to white noise.  She says that her vertigo gets worse with movement of her head or changing positions of her head.  She has nausea with the vertigo but has not vomited.  She is ambulatory.  On exam her neuro exam was nonfocal and otherwise unremarkable.  She has some lateral nystagmus seen.  An EKG was done and did not show any signs of ischemia and no significant change from the prior EKG.  She did have a good deal of earwax in the left ear which could possibly be contributing to her symptoms.  I discussed treatment of this with her and we will be discharging her with some Debrox solution.  Additionally she will be discharged with medication to treat the vertigo.  I suspect that this is peripheral vertigo probably benign positional vertigo    Pertinent Labs & Imaging studies reviewed. (See chart for details)    The importance of close follow up was discussed. We reviewed warning signs and symptoms, and I instructed Ms. Prince to return to the emergency department immediately if she develops any new or worsening symptoms. I provided additional  verbal discharge instructions. Ms. Prince expressed understanding and agreement with this plan of care, her questions were answered, and she was discharged in stable condition.       MEDICATIONS GIVEN IN THE EMERGENCY:  Medications   droperidol (INAPSINE) injection 0.625 mg (0.625 mg Intravenous Given 12/10/22 0957)   diphenhydrAMINE (BENADRYL) injection 25 mg (25 mg Intravenous Given 12/10/22 0957)   0.9% sodium chloride BOLUS (0 mLs Intravenous Stopped 12/10/22 1104)   meclizine (ANTIVERT) tablet 25 mg (25 mg Oral Given 12/10/22 1014)       NEW PRESCRIPTIONS STARTED AT TODAY'S ER VISIT:  Discharge Medication List as of 12/10/2022 11:07 AM      START taking these medications    Details   carbamide peroxide (DEBROX) 6.5 % otic solution Place 5 drops Into the left ear 2 times daily for 3 days, Disp-15 mL, R-0, Local Print      meclizine (ANTIVERT) 25 MG tablet Take 1 tablet (25 mg) by mouth 3 times daily as needed for dizziness, Disp-21 tablet, R-0, Local Print      prochlorperazine (COMPAZINE) 5 MG tablet 1-2 tabs by mouth every 8 hours as needed for nausea, Disp-12 tablet, R-0, Local Print                =================================================================    HPI    Patient information was obtained from: Patient,      Use of : N/A       Shell Prince is a 57 year old female with a past medical history of hyperparathyroidism, tricuspid regurgitation, who presents to the ED by walk in for evaluation of vertigo    Per patient,  Patient reports dizziness, feeling as though the room is spinning, and as if she is going to pass out. Patient reports her symptoms began this morning while laying in bed with her  listening to white/brown noise. Her symptoms started when they began playing the brown noise. Patient experienced dizziness when she turns her head or leans it down. Patient additionally reports nausea and dry heaving, but did not vomit. Patient is able to stand and walk.  Patient has inflammatory polyarthropathy but reports it has been months since her last episode, and her symptoms from it normally occur in her lower extremities. Patient denies loss of consciousness, tinnitus, muffled hearing, ear pain, cough, congestion, visual disturbances, leg or arm weakness, or any other complaints at this time.    Per ,   notes that when patient was experiencing symptoms, it appeared her head snapped back and forth a few times.       REVIEW OF SYSTEMS   Review of Systems   HENT: Negative for congestion, ear pain, hearing loss and tinnitus.    Eyes: Negative for visual disturbance.   Respiratory: Negative for cough.    Gastrointestinal: Positive for nausea. Negative for vomiting.   Neurological: Positive for dizziness and light-headedness. Negative for syncope and weakness.   All other systems reviewed and are negative.       PAST MEDICAL HISTORY:  History reviewed. No pertinent past medical history.    PAST SURGICAL HISTORY:  Past Surgical History:   Procedure Laterality Date     ABDOMINOPLASTY       DAVINCI RECTOPEXY N/A 11/18/2020    Procedure: ROBOTIC-ASSISTED VENTRAL RECTOPEXY;  Surgeon: Yari Thomas MD;  Location:  OR     GASTRIC BYPASS  2005     [facility] GASTRIC BYPASS,OBESE<100CM KRIS-EN-Y      Description: Gastric Surgery For Morbid Obesity Bypass With Kris-en-Y;  Recorded: 02/14/2008;       CURRENT MEDICATIONS:    No current facility-administered medications for this encounter.    Current Outpatient Medications:      carbamide peroxide (DEBROX) 6.5 % otic solution, Place 5 drops Into the left ear 2 times daily for 3 days, Disp: 15 mL, Rfl: 0     meclizine (ANTIVERT) 25 MG tablet, Take 1 tablet (25 mg) by mouth 3 times daily as needed for dizziness, Disp: 21 tablet, Rfl: 0     prochlorperazine (COMPAZINE) 5 MG tablet, 1-2 tabs by mouth every 8 hours as needed for nausea, Disp: 12 tablet, Rfl: 0     amitriptyline (ELAVIL) 50 MG tablet, Take 50 mg by mouth At  "Bedtime, Disp: , Rfl:      Cyanocobalamin (B-12) 1000 MCG TBCR, Take 1,000 mcg by mouth, Disp: , Rfl:      multivitamin, therapeutic (THERA-VIT) TABS tablet, Take 1 tablet by mouth daily, Disp: , Rfl:      omeprazole 20 MG tablet, Take 20 mg by mouth every morning, Disp: , Rfl:      ondansetron (ZOFRAN-ODT) 4 MG ODT tab, Take 4 mg by mouth every 8 hours as needed for nausea, Disp: , Rfl:      oxyCODONE (ROXICODONE) 5 MG tablet, Take 1 tablet (5 mg) by mouth every 4 hours as needed for moderate to severe pain, Disp: 12 tablet, Rfl: 0    ALLERGIES:  Allergies   Allergen Reactions     Tetracyclines        FAMILY HISTORY:  Family History   Problem Relation Age of Onset     Sleep Apnea Sister      Breast Cancer Sister 56.00     Sleep Apnea Sister        SOCIAL HISTORY:   Social History     Socioeconomic History     Marital status: Single   Tobacco Use     Smoking status: Never     Smokeless tobacco: Never   Substance and Sexual Activity     Alcohol use: Never     Drug use: Never       PHYSICAL EXAM:    Vitals: /86   Pulse 79   Temp 97.4  F (36.3  C) (Oral)   Resp 26   Ht 1.676 m (5' 6\")   Wt 83.9 kg (185 lb)   LMP 11/18/2014 (Approximate)   SpO2 100%   BMI 29.86 kg/m     Constitutional: Well developed, well nourished. Comfortable appearing.  HEAD:Normocephalic, atraumatic,   Eyes: PERRLA, conjunctiva clear, no discharge. EOM intact with lateral nystagnus on left gaze  ENT: mucous membranes moist, nose normal.   Neck- Supple, gross ROM intact.  No JVD.  No palpable nodes.  Pulmonary: Clear to auscultation bilaterally, no respiratory distress, no wheezing, speaks full sentences easily.  Chest: No chest wall tenderness  Cardiovascular: Normal heart rate, regular rhythm, no murmurs. No lower extremity edema, 2+ DP pulses.   GI: Soft, no tenderness to deep palpation in all quadrants, not distended, no masses.  No hepatosplenomegaly.  Musculoskeletal: Moving all 4 extremities intentionally and without pain. No " obvious deformity.  Back: No CVA tenderness  Skin: Warm, dry, no rash.  Neurologic: Alert & oriented x 3, speech clear, moving all extremities spontaneously   Psychiatric: Affect normal, cooperative.     LAB:  All pertinent labs reviewed and interpreted.  Labs Ordered and Resulted from Time of ED Arrival to Time of ED Departure - No data to display    RADIOLOGY:  No orders to display       EKG:   Performed at: 10:00 AM  Impression: Sinus rhythm. Normal ECG  Rate: 67  Rhythm: Sinus  QRS Interval: 98  QTc Interval: 448  Comparison: When compared with ECG of 9/7/2018, no significant change was found.  I have independently reviewed and interpreted the EKG(s) documented above.     PROCEDURES:   Procedures       I, Yolande Temple, am serving as a scribe to document services personally performed by Dr. Sean Dacosta based on my observation and the provider's statements to me. ISean M.D. attest that Yolande Temple is acting in a scribe capacity, has observed my performance of the services and has documented them in accordance with my direction.      Sean Dacosta M.D.  Emergency Medicine  [facility] EMERGENCY ROOM  [address] [facility] [id number] [phone]  Dept: [phone]       Sean Dacosta MD  12/29/22 0919

## 2022-12-12 ENCOUNTER — DOCUMENTATION ONLY (OUTPATIENT)
Dept: OTHER | Facility: CLINIC | Age: 57
End: 2022-12-12

## 2022-12-12 LAB
ATRIAL RATE - MUSE: 67 BPM
DIASTOLIC BLOOD PRESSURE - MUSE: NORMAL MMHG
INTERPRETATION ECG - MUSE: NORMAL
P AXIS - MUSE: 3 DEGREES
PR INTERVAL - MUSE: 156 MS
QRS DURATION - MUSE: 98 MS
QT - MUSE: 424 MS
QTC - MUSE: 448 MS
R AXIS - MUSE: 41 DEGREES
SYSTOLIC BLOOD PRESSURE - MUSE: NORMAL MMHG
T AXIS - MUSE: 25 DEGREES
VENTRICULAR RATE- MUSE: 67 BPM

## 2023-08-14 ENCOUNTER — TRANSFERRED RECORDS (OUTPATIENT)
Dept: HEALTH INFORMATION MANAGEMENT | Facility: CLINIC | Age: 58
End: 2023-08-14
Payer: COMMERCIAL

## 2023-09-13 ENCOUNTER — LAB REQUISITION (OUTPATIENT)
Dept: LAB | Facility: CLINIC | Age: 58
End: 2023-09-13

## 2023-09-13 DIAGNOSIS — Z01.818 ENCOUNTER FOR OTHER PREPROCEDURAL EXAMINATION: ICD-10-CM

## 2023-09-13 PROCEDURE — 80048 BASIC METABOLIC PNL TOTAL CA: CPT | Performed by: STUDENT IN AN ORGANIZED HEALTH CARE EDUCATION/TRAINING PROGRAM

## 2023-09-14 LAB
ANION GAP SERPL CALCULATED.3IONS-SCNC: 12 MMOL/L (ref 7–15)
BUN SERPL-MCNC: 24.1 MG/DL (ref 6–20)
CALCIUM SERPL-MCNC: 9.5 MG/DL (ref 8.6–10)
CHLORIDE SERPL-SCNC: 102 MMOL/L (ref 98–107)
CREAT SERPL-MCNC: 0.89 MG/DL (ref 0.51–0.95)
DEPRECATED HCO3 PLAS-SCNC: 25 MMOL/L (ref 22–29)
EGFRCR SERPLBLD CKD-EPI 2021: 75 ML/MIN/1.73M2
GLUCOSE SERPL-MCNC: 100 MG/DL (ref 70–99)
POTASSIUM SERPL-SCNC: 4.5 MMOL/L (ref 3.4–5.3)
SODIUM SERPL-SCNC: 139 MMOL/L (ref 136–145)

## 2023-09-28 ENCOUNTER — TRANSFERRED RECORDS (OUTPATIENT)
Dept: HEALTH INFORMATION MANAGEMENT | Facility: CLINIC | Age: 58
End: 2023-09-28
Payer: COMMERCIAL

## 2023-10-17 ENCOUNTER — LAB REQUISITION (OUTPATIENT)
Dept: LAB | Facility: CLINIC | Age: 58
End: 2023-10-17

## 2023-10-17 DIAGNOSIS — Z01.818 ENCOUNTER FOR OTHER PREPROCEDURAL EXAMINATION: ICD-10-CM

## 2023-10-17 LAB
ANION GAP SERPL CALCULATED.3IONS-SCNC: 14 MMOL/L (ref 7–15)
BUN SERPL-MCNC: 19.4 MG/DL (ref 6–20)
CALCIUM SERPL-MCNC: 9.5 MG/DL (ref 8.6–10)
CHLORIDE SERPL-SCNC: 102 MMOL/L (ref 98–107)
CREAT SERPL-MCNC: 0.87 MG/DL (ref 0.51–0.95)
DEPRECATED HCO3 PLAS-SCNC: 25 MMOL/L (ref 22–29)
EGFRCR SERPLBLD CKD-EPI 2021: 77 ML/MIN/1.73M2
GLUCOSE SERPL-MCNC: 110 MG/DL (ref 70–99)
POTASSIUM SERPL-SCNC: 4.4 MMOL/L (ref 3.4–5.3)
SODIUM SERPL-SCNC: 141 MMOL/L (ref 135–145)

## 2023-10-17 PROCEDURE — 80048 BASIC METABOLIC PNL TOTAL CA: CPT | Performed by: STUDENT IN AN ORGANIZED HEALTH CARE EDUCATION/TRAINING PROGRAM

## 2023-11-07 ENCOUNTER — TRANSFERRED RECORDS (OUTPATIENT)
Dept: HEALTH INFORMATION MANAGEMENT | Facility: CLINIC | Age: 58
End: 2023-11-07
Payer: COMMERCIAL

## 2023-11-20 ENCOUNTER — TRANSFERRED RECORDS (OUTPATIENT)
Dept: HEALTH INFORMATION MANAGEMENT | Facility: CLINIC | Age: 58
End: 2023-11-20
Payer: COMMERCIAL

## 2023-12-22 ENCOUNTER — TRANSFERRED RECORDS (OUTPATIENT)
Dept: HEALTH INFORMATION MANAGEMENT | Facility: CLINIC | Age: 58
End: 2023-12-22
Payer: COMMERCIAL

## 2025-02-06 ENCOUNTER — LAB REQUISITION (OUTPATIENT)
Dept: LAB | Facility: CLINIC | Age: 60
End: 2025-02-06

## 2025-02-06 DIAGNOSIS — Z13.6 ENCOUNTER FOR SCREENING FOR CARDIOVASCULAR DISORDERS: ICD-10-CM

## 2025-02-06 DIAGNOSIS — K21.9 GASTRO-ESOPHAGEAL REFLUX DISEASE WITHOUT ESOPHAGITIS: ICD-10-CM

## 2025-02-06 LAB
ANION GAP SERPL CALCULATED.3IONS-SCNC: 13 MMOL/L (ref 7–15)
BUN SERPL-MCNC: 12.9 MG/DL (ref 8–23)
CALCIUM SERPL-MCNC: 9.5 MG/DL (ref 8.8–10.4)
CHLORIDE SERPL-SCNC: 104 MMOL/L (ref 98–107)
CHOLEST SERPL-MCNC: 195 MG/DL
CREAT SERPL-MCNC: 0.84 MG/DL (ref 0.51–0.95)
EGFRCR SERPLBLD CKD-EPI 2021: 80 ML/MIN/1.73M2
FASTING STATUS PATIENT QL REPORTED: ABNORMAL
FASTING STATUS PATIENT QL REPORTED: NORMAL
GLUCOSE SERPL-MCNC: 95 MG/DL (ref 70–99)
HCO3 SERPL-SCNC: 22 MMOL/L (ref 22–29)
HDLC SERPL-MCNC: 55 MG/DL
LDLC SERPL CALC-MCNC: 106 MG/DL
NONHDLC SERPL-MCNC: 140 MG/DL
POTASSIUM SERPL-SCNC: 4.5 MMOL/L (ref 3.4–5.3)
SODIUM SERPL-SCNC: 139 MMOL/L (ref 135–145)
TRIGL SERPL-MCNC: 171 MG/DL

## 2025-02-06 PROCEDURE — 80048 BASIC METABOLIC PNL TOTAL CA: CPT | Performed by: STUDENT IN AN ORGANIZED HEALTH CARE EDUCATION/TRAINING PROGRAM

## 2025-02-06 PROCEDURE — 82465 ASSAY BLD/SERUM CHOLESTEROL: CPT | Performed by: STUDENT IN AN ORGANIZED HEALTH CARE EDUCATION/TRAINING PROGRAM

## 2025-02-06 PROCEDURE — 84295 ASSAY OF SERUM SODIUM: CPT | Performed by: STUDENT IN AN ORGANIZED HEALTH CARE EDUCATION/TRAINING PROGRAM

## (undated) DEVICE — GLOVE PROTEXIS BLUE W/NEU-THERA 6.5  2D73EB65

## (undated) DEVICE — KIT PATIENT POSITIONING PIGAZZI LATEX FREE 40580

## (undated) DEVICE — SU PDS II 3-0 SH 27" Z316H

## (undated) DEVICE — PACK DAVINCI GYN SMA15GDFS1

## (undated) DEVICE — DRAPE IOBAN INCISE 23X17" 6650EZ

## (undated) DEVICE — DAVINCI XI SEAL UNIVERSAL 5-8MM 470361

## (undated) DEVICE — NDL INSUFFLATION 13GA 120MM C2201

## (undated) DEVICE — SU STRATAFIX PDS PLUS 3-0 SPIRAL SH 15CM SXPP1B420

## (undated) DEVICE — DECANTER VIAL 2006S

## (undated) DEVICE — DAVINCI XI DRAPE COLUMN 470341

## (undated) DEVICE — DAVINCI HOT SHEARS TIP COVER  400180

## (undated) DEVICE — SOL NACL 0.9% IRRIG 1000ML BOTTLE 2F7124

## (undated) DEVICE — CATH TRAY FOLEY SURESTEP 16FR WDRAIN BAG STLK LATEX A300316A

## (undated) DEVICE — SU VICRYL 3-0 RB-1 27" J305H

## (undated) DEVICE — ADH SKIN CLOSURE PREMIERPRO EXOFIN 1.0ML 3470

## (undated) DEVICE — SUCTION CANISTER MEDIVAC LINER 3000ML W/LID 65651-530

## (undated) DEVICE — LINEN TOWEL PACK X5 5464

## (undated) DEVICE — TUBING CONMED AIRSEAL SMOKE EVAC INSUFFLATION ASM-EVAC

## (undated) DEVICE — GOWN IMPERVIOUS ZONED LG

## (undated) DEVICE — SOL WATER IRRIG 1000ML BOTTLE 2F7114

## (undated) DEVICE — GLOVE PROTEXIS W/NEU-THERA 6.0  2D73TE60

## (undated) DEVICE — SU VICRYL 2-0 SH 27" J317H

## (undated) DEVICE — ENDO TROCAR CONMED AIRSEAL BLADELESS 08X120MM IAS8-120LP

## (undated) DEVICE — SU MONOCRYL 4-0 PS-2 18" UND Y496G

## (undated) DEVICE — DAVINCI XI NDL DRIVER MEGA SUTURE CUT 8MM 470309

## (undated) DEVICE — DAVINCI XI DRAPE ARM 470015

## (undated) DEVICE — SU ETHIBOND 2-0 SHDA 30" X563H

## (undated) DEVICE — ESU GROUND PAD UNIVERSAL W/O CORD

## (undated) DEVICE — DAVINCI XI OBTURATOR BLADELESS 8MM 470359

## (undated) RX ORDER — CEFOTETAN DISODIUM 1 G/10ML
INJECTION, POWDER, FOR SOLUTION INTRAMUSCULAR; INTRAVENOUS
Status: DISPENSED
Start: 2020-11-18

## (undated) RX ORDER — HYDROMORPHONE HYDROCHLORIDE 1 MG/ML
INJECTION, SOLUTION INTRAMUSCULAR; INTRAVENOUS; SUBCUTANEOUS
Status: DISPENSED
Start: 2020-11-18

## (undated) RX ORDER — HEPARIN SODIUM 5000 [USP'U]/.5ML
INJECTION, SOLUTION INTRAVENOUS; SUBCUTANEOUS
Status: DISPENSED
Start: 2020-11-18

## (undated) RX ORDER — PROPOFOL 10 MG/ML
INJECTION, EMULSION INTRAVENOUS
Status: DISPENSED
Start: 2020-11-18

## (undated) RX ORDER — FENTANYL CITRATE 0.05 MG/ML
INJECTION, SOLUTION INTRAMUSCULAR; INTRAVENOUS
Status: DISPENSED
Start: 2020-11-18

## (undated) RX ORDER — FENTANYL CITRATE 50 UG/ML
INJECTION, SOLUTION INTRAMUSCULAR; INTRAVENOUS
Status: DISPENSED
Start: 2020-11-18

## (undated) RX ORDER — LIDOCAINE HYDROCHLORIDE 20 MG/ML
INJECTION, SOLUTION EPIDURAL; INFILTRATION; INTRACAUDAL; PERINEURAL
Status: DISPENSED
Start: 2020-11-18

## (undated) RX ORDER — ONDANSETRON 2 MG/ML
INJECTION INTRAMUSCULAR; INTRAVENOUS
Status: DISPENSED
Start: 2020-11-18

## (undated) RX ORDER — ALBUMIN, HUMAN INJ 5% 5 %
SOLUTION INTRAVENOUS
Status: DISPENSED
Start: 2020-11-18

## (undated) RX ORDER — DEXAMETHASONE SODIUM PHOSPHATE 4 MG/ML
INJECTION, SOLUTION INTRA-ARTICULAR; INTRALESIONAL; INTRAMUSCULAR; INTRAVENOUS; SOFT TISSUE
Status: DISPENSED
Start: 2020-11-18

## (undated) RX ORDER — ACETAMINOPHEN 325 MG/1
TABLET ORAL
Status: DISPENSED
Start: 2020-11-18

## (undated) RX ORDER — BUPIVACAINE HYDROCHLORIDE 5 MG/ML
INJECTION, SOLUTION EPIDURAL; INTRACAUDAL
Status: DISPENSED
Start: 2020-11-18